# Patient Record
Sex: MALE | Race: WHITE | NOT HISPANIC OR LATINO | Employment: FULL TIME | URBAN - METROPOLITAN AREA
[De-identification: names, ages, dates, MRNs, and addresses within clinical notes are randomized per-mention and may not be internally consistent; named-entity substitution may affect disease eponyms.]

---

## 2024-07-15 ENCOUNTER — OFFICE VISIT (OUTPATIENT)
Dept: FAMILY MEDICINE | Facility: CLINIC | Age: 24
End: 2024-07-15
Payer: COMMERCIAL

## 2024-07-15 VITALS
BODY MASS INDEX: 27.23 KG/M2 | OXYGEN SATURATION: 96 % | HEART RATE: 95 BPM | DIASTOLIC BLOOD PRESSURE: 78 MMHG | WEIGHT: 194.5 LBS | SYSTOLIC BLOOD PRESSURE: 134 MMHG | RESPIRATION RATE: 16 BRPM | HEIGHT: 71 IN | TEMPERATURE: 98.6 F

## 2024-07-15 DIAGNOSIS — Z11.4 SCREENING FOR HIV (HUMAN IMMUNODEFICIENCY VIRUS): ICD-10-CM

## 2024-07-15 DIAGNOSIS — M65.4 DE QUERVAIN'S DISEASE (TENOSYNOVITIS): ICD-10-CM

## 2024-07-15 DIAGNOSIS — F43.22 ADJUSTMENT DISORDER WITH ANXIOUS MOOD: ICD-10-CM

## 2024-07-15 DIAGNOSIS — Z11.1 SCREENING FOR TUBERCULOSIS: ICD-10-CM

## 2024-07-15 DIAGNOSIS — Z13.1 SCREENING FOR DIABETES MELLITUS: ICD-10-CM

## 2024-07-15 DIAGNOSIS — Z11.59 NEED FOR HEPATITIS C SCREENING TEST: ICD-10-CM

## 2024-07-15 DIAGNOSIS — Z13.220 SCREENING CHOLESTEROL LEVEL: ICD-10-CM

## 2024-07-15 DIAGNOSIS — Z00.00 ENCOUNTER FOR WELLNESS EXAMINATION IN ADULT: Primary | ICD-10-CM

## 2024-07-15 DIAGNOSIS — E53.8 VITAMIN B12 DEFICIENCY (NON ANEMIC): ICD-10-CM

## 2024-07-15 DIAGNOSIS — E55.9 VITAMIN D DEFICIENCY: ICD-10-CM

## 2024-07-15 DIAGNOSIS — K50.819 CROHN'S DISEASE OF SMALL AND LARGE INTESTINES WITH COMPLICATION (H): ICD-10-CM

## 2024-07-15 LAB
ALBUMIN SERPL BCG-MCNC: 4.5 G/DL (ref 3.5–5.2)
ALP SERPL-CCNC: 75 U/L (ref 40–150)
ALT SERPL W P-5'-P-CCNC: 17 U/L (ref 0–70)
ANION GAP SERPL CALCULATED.3IONS-SCNC: 11 MMOL/L (ref 7–15)
AST SERPL W P-5'-P-CCNC: 22 U/L (ref 0–45)
BILIRUB SERPL-MCNC: 0.8 MG/DL
BUN SERPL-MCNC: 13 MG/DL (ref 6–20)
CALCIUM SERPL-MCNC: 9.1 MG/DL (ref 8.6–10)
CHLORIDE SERPL-SCNC: 106 MMOL/L (ref 98–107)
CHOLEST SERPL-MCNC: 168 MG/DL
CREAT SERPL-MCNC: 1.03 MG/DL (ref 0.67–1.17)
EGFRCR SERPLBLD CKD-EPI 2021: >90 ML/MIN/1.73M2
ERYTHROCYTE [DISTWIDTH] IN BLOOD BY AUTOMATED COUNT: 12.2 % (ref 10–15)
FASTING STATUS PATIENT QL REPORTED: YES
FASTING STATUS PATIENT QL REPORTED: YES
GLUCOSE SERPL-MCNC: 95 MG/DL (ref 70–99)
HCO3 SERPL-SCNC: 25 MMOL/L (ref 22–29)
HCT VFR BLD AUTO: 44.3 % (ref 40–53)
HCV AB SERPL QL IA: NONREACTIVE
HDLC SERPL-MCNC: 32 MG/DL
HGB BLD-MCNC: 14.8 G/DL (ref 13.3–17.7)
HIV 1+2 AB+HIV1 P24 AG SERPL QL IA: NONREACTIVE
LDLC SERPL CALC-MCNC: 126 MG/DL
MCH RBC QN AUTO: 29.1 PG (ref 26.5–33)
MCHC RBC AUTO-ENTMCNC: 33.4 G/DL (ref 31.5–36.5)
MCV RBC AUTO: 87 FL (ref 78–100)
NONHDLC SERPL-MCNC: 136 MG/DL
PLATELET # BLD AUTO: 271 10E3/UL (ref 150–450)
POTASSIUM SERPL-SCNC: 3.7 MMOL/L (ref 3.4–5.3)
PROT SERPL-MCNC: 7.5 G/DL (ref 6.4–8.3)
RBC # BLD AUTO: 5.09 10E6/UL (ref 4.4–5.9)
SODIUM SERPL-SCNC: 142 MMOL/L (ref 135–145)
TRIGL SERPL-MCNC: 49 MG/DL
VIT B12 SERPL-MCNC: 211 PG/ML (ref 232–1245)
VIT D+METAB SERPL-MCNC: 22 NG/ML (ref 20–50)
WBC # BLD AUTO: 5 10E3/UL (ref 4–11)

## 2024-07-15 PROCEDURE — 82306 VITAMIN D 25 HYDROXY: CPT | Performed by: FAMILY MEDICINE

## 2024-07-15 PROCEDURE — 80053 COMPREHEN METABOLIC PANEL: CPT | Performed by: FAMILY MEDICINE

## 2024-07-15 PROCEDURE — 90471 IMMUNIZATION ADMIN: CPT | Performed by: FAMILY MEDICINE

## 2024-07-15 PROCEDURE — 80061 LIPID PANEL: CPT | Performed by: FAMILY MEDICINE

## 2024-07-15 PROCEDURE — 90472 IMMUNIZATION ADMIN EACH ADD: CPT | Performed by: FAMILY MEDICINE

## 2024-07-15 PROCEDURE — 90636 HEP A/HEP B VACC ADULT IM: CPT | Performed by: FAMILY MEDICINE

## 2024-07-15 PROCEDURE — 87389 HIV-1 AG W/HIV-1&-2 AB AG IA: CPT | Performed by: FAMILY MEDICINE

## 2024-07-15 PROCEDURE — 85027 COMPLETE CBC AUTOMATED: CPT | Performed by: FAMILY MEDICINE

## 2024-07-15 PROCEDURE — 86803 HEPATITIS C AB TEST: CPT | Performed by: FAMILY MEDICINE

## 2024-07-15 PROCEDURE — 36415 COLL VENOUS BLD VENIPUNCTURE: CPT | Performed by: FAMILY MEDICINE

## 2024-07-15 PROCEDURE — 90651 9VHPV VACCINE 2/3 DOSE IM: CPT | Performed by: FAMILY MEDICINE

## 2024-07-15 PROCEDURE — 86481 TB AG RESPONSE T-CELL SUSP: CPT | Performed by: FAMILY MEDICINE

## 2024-07-15 PROCEDURE — 82607 VITAMIN B-12: CPT | Performed by: FAMILY MEDICINE

## 2024-07-15 PROCEDURE — 90715 TDAP VACCINE 7 YRS/> IM: CPT | Performed by: FAMILY MEDICINE

## 2024-07-15 PROCEDURE — 99213 OFFICE O/P EST LOW 20 MIN: CPT | Mod: 25 | Performed by: FAMILY MEDICINE

## 2024-07-15 PROCEDURE — 90480 ADMN SARSCOV2 VAC 1/ONLY CMP: CPT | Performed by: FAMILY MEDICINE

## 2024-07-15 PROCEDURE — 91320 SARSCV2 VAC 30MCG TRS-SUC IM: CPT | Performed by: FAMILY MEDICINE

## 2024-07-15 PROCEDURE — 99385 PREV VISIT NEW AGE 18-39: CPT | Mod: 25 | Performed by: FAMILY MEDICINE

## 2024-07-15 SDOH — HEALTH STABILITY: PHYSICAL HEALTH: ON AVERAGE, HOW MANY MINUTES DO YOU ENGAGE IN EXERCISE AT THIS LEVEL?: 10 MIN

## 2024-07-15 SDOH — HEALTH STABILITY: PHYSICAL HEALTH: ON AVERAGE, HOW MANY DAYS PER WEEK DO YOU ENGAGE IN MODERATE TO STRENUOUS EXERCISE (LIKE A BRISK WALK)?: 5 DAYS

## 2024-07-15 ASSESSMENT — PAIN SCALES - GENERAL: PAINLEVEL: NO PAIN (0)

## 2024-07-15 ASSESSMENT — SOCIAL DETERMINANTS OF HEALTH (SDOH): HOW OFTEN DO YOU GET TOGETHER WITH FRIENDS OR RELATIVES?: ONCE A WEEK

## 2024-07-15 NOTE — NURSING NOTE
Pt received COVID vaccine, TDAP vaccine, HPV9 vaccine, and Twinrix vaccine per provider, Dr. Heart's, orders.   Prior to immunization administration, verified patients identity using patient s name and date of birth. Pt given VIS forms prior to immunization administration.     Patient instructed to remain in clinic for 15 minutes afterwards, and to report any adverse reactions.     Performed by Mynor Mendenhall RN on 7/15/2024.

## 2024-07-15 NOTE — PROGRESS NOTES
{PROVIDER CHARTING PREFERENCE:382825}    Asya Moody is a 24 year old, presenting for the following health issues:  Physical      7/15/2024     9:36 AM   Additional Questions   Roomed by SUSAN Tyler   Accompanied by n/A     Healthy Habits:     Taking medications regularly:  0  History of Present Illness       Reason for visit:  New PCP check-in, Crohn s care, and immunization    He eats 2-3 servings of fruits and vegetables daily.He consumes 1 sweetened beverage(s) daily.He exercises with enough effort to increase his heart rate 9 or less minutes per day.  He exercises with enough effort to increase his heart rate 3 or less days per week.   He is taking medications regularly.       {MA/LPN/RN Pre-Provider Visit Orders- hCG/UA/Strep (Optional):501926}  {SUPERLIST (Optional):757556}  {additonal problems for provider to add (Optional):490871}    {ROS Picklists (Optional):845024}      Objective    There were no vitals taken for this visit.  There is no height or weight on file to calculate BMI.  Physical Exam   {Exam List (Optional):306043}    {Diagnostic Test Results (Optional):199497}        Signed Electronically by: Lauro Heck DO  {Email feedback regarding this note to [email or web address]   :811592}

## 2024-07-15 NOTE — PROGRESS NOTES
Preventive Care Visit  [facility]  Lauro Heck DO, Family Medicine  Jul 15, 2024  {Provider  Link to SmartSet :488801}    {PROVIDER CHARTING PREFERENCE:190137}    Asya Moody is a 24 year old, presenting for the following:  Physical        7/15/2024     9:36 AM   Additional Questions   Roomed by SUSAN Tylerrentice   Accompanied by n/A        Health Care Directive  Patient does not have a Health Care Directive or Living Will: {ADVANCE_DIRECTIVE_STATUS:734178}    HPI  ***  {MA/LPN/RN Pre-Provider Visit Orders- hCG/UA/Strep (Optional):691627}  {SUPERLIST (Optional):412442}  {additonal problems for provider to add (Optional):812072}       No data to display                   No data to display                   No data to display                  7/15/2024   Social Factors   Worry food won't last until get money to buy more No   Food not last or not have enough money for food? Yes   Do you have housing? (Housing is defined as stable permanent housing and does not include staying ouside in a car, in a tent, in an abandoned building, in an overnight shelter, or couch-surfing.) No   Are you worried about losing your housing? No   Lack of transportation? No   Unable to get utilities (heat,electricity)? No   Want help with housing or utility concern? No      (!) FOOD SECURITY CONCERN PRESENT(!) HOUSING CONCERN PRESENT       No data to display                     Today's PHQ-2 Score:       7/15/2024     8:07 AM   PHQ-2 ( 1999 Pfizer)   Q1: Little interest or pleasure in doing things 1   Q2: Feeling down, depressed or hopeless 1   PHQ-2 Score 2   Q1: Little interest or pleasure in doing things Several days   Q2: Feeling down, depressed or hopeless Several days   PHQ-2 Score 2            No data to display              Social History     Tobacco Use    Smoking status: Never    Smokeless tobacco: Never   Substance Use Topics    Alcohol use: Never    Drug use: Never     {Provider   If there are gaps in the social history shown above, please follow the link to update and then refresh the note Link to Social and Substance History :205694}         No data to display              {Provider  REQUIRED FOR AWV Use the storyboard to review patient history, after sections have been marked as reviewed, refresh note to capture documentation:080467}   Reviewed and updated as needed this visit by Provider                    {HISTORY OPTIONS (Optional):261544}    {ROS Picklists (Optional):286095}     Objective    Exam  There were no vitals taken for this visit.   There is no height or weight on file to calculate BMI.    Physical Exam  {Exam Choices (Optional):960115}        Signed Electronically by: Lauro Heck DO  {Email feedback regarding this note to [email or web address]   :721530}

## 2024-07-15 NOTE — PROGRESS NOTES
Preventive Care Visit  [facility]  Lauro Heck DO, Family Medicine  Jul 15, 2024      Assessment & Plan     Encounter for wellness examination in adult  I encouraged him to keep eating healthy foods and to work on getting regular exercise.  We are going to check fasting labs as listed below.    Crohn's disease of small and large intestines with complication (H)  He has been following closely with his GI specialist at [facility] in [place], [place].  I put in a referral so that he can establish care with a GI specialist here in the [facility] and I refilled the Humira to cover him until he has that appointment.  - adalimumab (HUMIRA) 40 MG/0.8ML prefilled syringe kit; Inject 0.8 mLs (40 mg) subcutaneously every 7 days  - Adult GI  Referral - Consult Only; Future    Adjustment disorder with anxious mood  He requested a referral to see a therapist to help with some anxiety symptoms.  - Adult Mental Health  Referral; Future    De Quervain's tenosynovitis, right   His symptoms, which have been present for the past couple of years, are consistent with de Quervain's tenosynovitis.  We discussed strategies that can help with this overuse problem including using a thumb spica splint.  He will let me know if symptoms do not improve.    Vitamin B12 deficiency (non anemic)  - CBC with platelets; Future  - Vitamin B12; Future  - CBC with platelets  - Vitamin B12    Vitamin D deficiency  - Vitamin D Deficiency; Future  - Vitamin D Deficiency    Screening for HIV (human immunodeficiency virus)  - HIV Antigen Antibody Combo; Future  - HIV Antigen Antibody Combo    Need for hepatitis C screening test  - Hepatitis C Screen Reflex to HCV RNA Quant and Genotype; Future  - Hepatitis C Screen Reflex to HCV RNA Quant and Genotype    Screening cholesterol level  - Lipid Profile (Chol, Trig, HDL, LDL calc); Future  - Lipid Profile (Chol, Trig, HDL, LDL calc)    Screening for diabetes  "mellitus  - Comprehensive metabolic panel (BMP + Alb, Alk Phos, ALT, AST, Total. Bili, TP); Future  - Comprehensive metabolic panel (BMP + Alb, Alk Phos, ALT, AST, Total. Bili, TP)    Screening for tuberculosis  - Quantiferon TB Gold Plus; Future  - Quantiferon TB Gold Plus    Patient has been advised of split billing requirements and indicates understanding: Yes        BMI  Estimated body mass index is 27.13 kg/m  as calculated from the following:    Height as of this encounter: 1.803 m (5' 11\").    Weight as of this encounter: 88.2 kg (194 lb 8 oz).   Weight management plan: Discussed healthy diet and exercise guidelines    Counseling  Appropriate preventive services were discussed with this patient, including applicable screening as appropriate for fall prevention, nutrition, physical activity, Tobacco-use cessation, weight loss and cognition.  Checklist reviewing preventive services available has been given to the patient.  Reviewed patient's diet, addressing concerns and/or questions.           Asya Moody is a 24 year old, presenting for the following:  Physical (Pt is fasting)        7/15/2024     9:36 AM   Additional Questions   Roomed by Ruby WAKEFIELD MA apprentice   Accompanied by n/A          History of Present Illness       Reason for visit:  New PCP check-in, Crohn s care, and immunization    He eats 2-3 servings of fruits and vegetables daily.He consumes 1 sweetened beverage(s) daily.He exercises with enough effort to increase his heart rate 9 or less minutes per day.  He exercises with enough effort to increase his heart rate 3 or less days per week.   He is taking medications regularly.    He has a medical history significant for Crohn's disease, B12 deficiency and vitamin D deficiency.  He recently moved to [facility] from [place], [place].  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  He has been on Humira 40 mg weekly to help control the " Crohn's.  He needs to establish care with a new gastroenterology specialist.    Previous endoscopy and imagin - erosion at pylorus, rectal ulcers   - (Pre TNF) - duodenal erosions with surrounding erythema/ inflammation in descending colon, sigmoid, rectum with erythema/edema/exudate but no deep ulcers, ileocecal valve inflammation , TI w/ erythema/petichiae/edema, frability  - normal EGD/colon on infliximab  3/2023 - colonoscopy - Total SES-CD aggregate score: 12. Appearance is similar to a Bucio 2   panUC with more superifical erosions. Biopsies were taken with a cold   forceps for histology. Right colon, jar A, transverse, jar C, left   colon, jar D, rectum, jar E. Left-sided inflammation as somewhat worse   than right-sided with slightly large aphthae, still 2mm in size/small,   with mucus.similar to bucio 1-2 uc, no aphthae in ileum    Pathologic Diagnosis   A. Colon, right, biopsy:  Colonic mucosa with mild to moderate active inflammation.     B. Terminal ileum, biopsy:  Ileal mucosa with focal minimal to mild active inflammation.     C. Colon, transverse, biopsy:  Colonic mucosa with mild active inflammation.     D. Colon, left, biopsy:  Mild active colitis with increased lamina propria lymphoplasmacytic inflammation and focal mucosal granuloma.     E. Rectum, biopsy:  Mild active proctitis with increased lamina propria lymphoplasmacytic inflammation and focal mucosal granuloma.     See comment.   Electronically signed by Gurmeet Hi MD on 4/3/2023 at 1259   Comment   As per EMR, clinical history of Crohn's disease/IBD is noted. No dysplasia is identified.     MRI  - distal ileal mild thickening, left colon collapse, renal cysts  Renal US  cystic change in upper pole of L kidney  Dexa  - normal                7/15/2024   General Health   How would you rate your overall physical health? Good   Feel stress (tense, anxious, or unable to sleep) Rather much      (!) STRESS CONCERN       7/15/2024   Nutrition   Three or more servings of calcium each day? (!) I DON'T KNOW   Diet: Regular (no restrictions)   How many servings of fruit and vegetables per day? (!) 2-3   How many sweetened beverages each day? 0-1            7/15/2024   Exercise   Days per week of moderate/strenous exercise 5 days   Average minutes spent exercising at this level 10 min            7/15/2024   Social Factors   Frequency of gathering with friends or relatives Once a week   Worry food won't last until get money to buy more No    No   Food not last or not have enough money for food? Yes    Yes   Do you have housing? (Housing is defined as stable permanent housing and does not include staying ouside in a car, in a tent, in an abandoned building, in an overnight shelter, or couch-surfing.) Yes    No   Are you worried about losing your housing? No    No   Lack of transportation? No    No   Unable to get utilities (heat,electricity)? No    No   Want help with housing or utility concern? No       Multiple values from one day are sorted in reverse-chronological order   (!) FOOD SECURITY CONCERN PRESENT      7/15/2024   Dental   Dentist two times every year? Yes            7/15/2024   TB Screening   Were you born outside of the [place]? No            Today's PHQ-2 Score:       7/15/2024     8:07 AM   PHQ-2 ( 1999 Pfizer)   Q1: Little interest or pleasure in doing things 1   Q2: Feeling down, depressed or hopeless 1   PHQ-2 Score 2   Q1: Little interest or pleasure in doing things Several days   Q2: Feeling down, depressed or hopeless Several days   PHQ-2 Score 2           7/15/2024   Substance Use   Alcohol more than 3/day or more than 7/wk No   Do you use any other substances recreationally? No        Social History     Tobacco Use    Smoking status: Never    Smokeless tobacco: Never   Vaping Use    Vaping status: Never Used   Substance Use Topics    Alcohol use: Never    Drug use: Never             7/15/2024   One time HIV Screening  "  Previous HIV test? No          7/15/2024   STI Screening   New sexual partner(s) since last STI/HIV test? No            7/15/2024   Contraception/Family Planning   Questions about contraception or family planning No           Reviewed and updated as needed this visit by Provider     Meds                      Review of Systems  Constitutional, HEENT, cardiovascular, pulmonary, GI, , musculoskeletal, neuro, skin, endocrine and psych systems are negative, except as otherwise noted.     Objective    Exam  /78   Pulse 95   Temp 98.6  F (37  C) (Oral)   Resp 16   Ht 1.803 m (5' 11\")   Wt 88.2 kg (194 lb 8 oz)   SpO2 96%   BMI 27.13 kg/m     Estimated body mass index is 27.13 kg/m  as calculated from the following:    Height as of this encounter: 1.803 m (5' 11\").    Weight as of this encounter: 88.2 kg (194 lb 8 oz).    Physical Exam  GENERAL: alert and no distress  EYES: Eyes grossly normal to inspection, PERRL and conjunctivae and sclerae normal  HENT: ear canals and TM's normal, nose and mouth without ulcers or lesions  NECK: no adenopathy, no asymmetry, masses, or scars  RESP: lungs clear to auscultation - no rales, rhonchi or wheezes  CV: regular rate and rhythm, normal S1 S2, no S3 or S4, no murmur, click or rub, no peripheral edema  ABDOMEN: soft, nontender, no hepatosplenomegaly, no masses and bowel sounds normal  MS: no gross musculoskeletal defects noted, no edema.  Finkelstein's test positive on the right.  SKIN: no suspicious lesions or rashes  NEURO: Normal strength and tone, mentation intact and speech normal  PSYCH: mentation appears normal, affect normal/bright        Signed Electronically by: Lauro Heck, DO    "

## 2024-07-16 LAB
GAMMA INTERFERON BACKGROUND BLD IA-ACNC: 0.07 IU/ML
M TB IFN-G BLD-IMP: NEGATIVE
M TB IFN-G CD4+ BCKGRND COR BLD-ACNC: 9.93 IU/ML
MITOGEN IGNF BCKGRD COR BLD-ACNC: -0.01 IU/ML
MITOGEN IGNF BCKGRD COR BLD-ACNC: 0.05 IU/ML
QUANTIFERON MITOGEN: 10 IU/ML
QUANTIFERON NIL TUBE: 0.07 IU/ML
QUANTIFERON TB1 TUBE: 0.06 IU/ML
QUANTIFERON TB2 TUBE: 0.12

## 2024-07-16 RX ORDER — LANOLIN ALCOHOL/MO/W.PET/CERES
1000 CREAM (GRAM) TOPICAL DAILY
Qty: 90 TABLET | Refills: 3 | Status: SHIPPED | OUTPATIENT
Start: 2024-07-16

## 2024-07-31 ENCOUNTER — MYC MEDICAL ADVICE (OUTPATIENT)
Dept: FAMILY MEDICINE | Facility: CLINIC | Age: 24
End: 2024-07-31
Payer: COMMERCIAL

## 2024-07-31 NOTE — TELEPHONE ENCOUNTER
REFERRAL INFORMATION:  Referring Provider:  Lauro Carreon DO   Referring Clinic:  [facility]   Reason for Visit/Diagnosis: Crohn's disease of small and large intestines with complication (H)      FUTURE VISIT INFORMATION:  Appointment Date: 9/24/2024  Appointment Time:      NOTES STATUS DETAILS   OFFICE NOTE from Referring Provider Internal 7/15/2024 OV with KODAK Bennett   OFFICE NOTE from Other Specialist Care Everywhere [facility]:   11/1/2023 OV with BATOOL Reeder   5/10/2023 OV with JUDIE Pride   HOSPITAL DISCHARGE SUMMARY/  ED VISITS N/A    OPERATIVE REPORT N/A    MEDICATION LIST Internal         ENDOSCOPY  Care Everywhere 3/24/2023 [facility]   COLONOSCOPY N/A    IMAGING (CT, MRI, EGD, MRCP, Small Bowel Follow Through/SBT, MR/CT Enterography) N/A

## 2024-08-07 ENCOUNTER — MYC MEDICAL ADVICE (OUTPATIENT)
Dept: FAMILY MEDICINE | Facility: CLINIC | Age: 24
End: 2024-08-07
Payer: COMMERCIAL

## 2024-08-07 DIAGNOSIS — K50.819 CROHN'S DISEASE OF SMALL AND LARGE INTESTINES WITH COMPLICATION (H): Primary | ICD-10-CM

## 2024-08-07 RX ORDER — ADALIMUMAB-ADAZ 40 MG/.4ML
40 INJECTION, SOLUTION SUBCUTANEOUS
Qty: 1.6 ML | Refills: 2 | Status: SHIPPED | OUTPATIENT
Start: 2024-08-07 | End: 2024-09-24 | Stop reason: ALTCHOICE

## 2024-08-07 NOTE — TELEPHONE ENCOUNTER
DE,  Please see below MyChart message and advise.  Pended new order if approved  Thanks,  Mabel LEWIS RN

## 2024-08-08 ENCOUNTER — TELEPHONE (OUTPATIENT)
Dept: FAMILY MEDICINE | Facility: CLINIC | Age: 24
End: 2024-08-08
Payer: COMMERCIAL

## 2024-08-08 NOTE — TELEPHONE ENCOUNTER
PA Needed    Medication: hyrimoz  QTY/DS: 1.6 per   NEW INS: no  Insurance Company:  [employer]  Pharmacy Filling the Rx:  [facility] :  new med  Date of last fill:

## 2024-08-08 NOTE — TELEPHONE ENCOUNTER
PA Initiation    Medication: HYRIMOZ 40 MG/0.4ML SC SOAJ  Insurance Company: [employer] - Phone [phone] Fax [phone]  Pharmacy Filling the Rx: [facility] - [place], [place] - [address]  Filling Pharmacy Phone:    Filling Pharmacy Fax:    Start Date: 8/8/2024  [id number]

## 2024-08-09 NOTE — TELEPHONE ENCOUNTER
Prior Authorization Approval    Medication: HYRIMOZ 40 MG/0.4ML SC SOAJ  Authorization Effective Date: 7/10/2024  Authorization Expiration Date: 8/9/2025  Approved Dose/Quantity: 4/28  Reference #: [id number]   Insurance Company: [employer] Phone [phone] Fax [phone]  Expected CoPay: $    CoPay Card Available:      Financial Assistance Needed:    Which Pharmacy is filling the prescription: [facility] - [place], [place] - [address]  Pharmacy Notified:    Patient Notified:

## 2024-08-12 ENCOUNTER — ALLIED HEALTH/NURSE VISIT (OUTPATIENT)
Dept: FAMILY MEDICINE | Facility: CLINIC | Age: 24
End: 2024-08-12
Payer: COMMERCIAL

## 2024-08-12 DIAGNOSIS — Z23 ENCOUNTER FOR IMMUNIZATION: Primary | ICD-10-CM

## 2024-08-12 PROCEDURE — 90636 HEP A/HEP B VACC ADULT IM: CPT

## 2024-08-12 PROCEDURE — 90471 IMMUNIZATION ADMIN: CPT

## 2024-08-12 PROCEDURE — 90472 IMMUNIZATION ADMIN EACH ADD: CPT

## 2024-08-12 PROCEDURE — 99207 PR NO CHARGE NURSE ONLY: CPT

## 2024-08-12 PROCEDURE — 90651 9VHPV VACCINE 2/3 DOSE IM: CPT

## 2024-08-12 NOTE — PROGRESS NOTES
Prior to immunization administration, verified patients identity using patient s name and date of birth. Please see Immunization Activity for additional information.     Screening Questionnaire for Adult Immunization    Are you sick today?   No   Do you have allergies to medications, food, a vaccine component or latex?   Yes   Have you ever had a serious reaction after receiving a vaccination?   No   Do you have a long-term health problem with heart, lung, kidney, or metabolic disease (e.g., diabetes), asthma, a blood disorder, no spleen, complement component deficiency, a cochlear implant, or a spinal fluid leak?  Are you on long-term aspirin therapy?   Yes   Do you have cancer, leukemia, HIV/AIDS, or any other immune system problem?   No   Do you have a parent, brother, or sister with an immune system problem?   No   In the past 3 months, have you taken medications that affect  your immune system, such as prednisone, other steroids, or anticancer drugs; drugs for the treatment of rheumatoid arthritis, Crohn s disease, or psoriasis; or have you had radiation treatments?   No   Have you had a seizure, or a brain or other nervous system problem?   No   During the past year, have you received a transfusion of blood or blood    products, or been given immune (gamma) globulin or antiviral drug?   No   For women: Are you pregnant or is there a chance you could become       pregnant during the next month?   No   Have you received any vaccinations in the past 4 weeks?   Yes     Immunization questionnaire was positive for at least one answer.  Notified Jacek.    I have reviewed the following standing orders:     This patient is due and qualifies for the Hepatitis A & B vaccine.    Click here for HEP A & B STANDING ORDER    I have reviewed the vaccines inclusion and exclusion criteria; No concerns regarding eligibility.         This patient is due and qualifies for the HPV vaccine.    Click here for HPV (Adult 15-45Y)  Standing Order     I have reviewed the vaccines inclusion and exclusion criteria;No concerns regarding eligibility.       Patient instructed to remain in clinic for 15 minutes afterwards, and to report any adverse reactions.     Screening performed by Layla Roy MA on 8/12/2024 at 3:03 PM.

## 2024-08-14 ENCOUNTER — TELEPHONE (OUTPATIENT)
Dept: FAMILY MEDICINE | Facility: CLINIC | Age: 24
End: 2024-08-14
Payer: COMMERCIAL

## 2024-08-14 DIAGNOSIS — K50.819 CROHN'S DISEASE OF SMALL AND LARGE INTESTINES WITH COMPLICATION (H): Primary | ICD-10-CM

## 2024-08-14 NOTE — TELEPHONE ENCOUNTER
Patient called back,    Patient verbalized understanding of below message.  Patient will continue with Humira until GI appointment    Adriel GUPTA RN

## 2024-08-14 NOTE — TELEPHONE ENCOUNTER
Please contact this patient and let him know that the Hyrimoz appears to be on backorder and not available.  It looks like he should still have Humira available.  I recommend that he continue with this (Humira) to see if he tolerates it well.  I know he was having some discomfort with injections early on.  Hopefully this has improved.  It would be helpful to get input from GI about alternatives once he is able to get in for an appointment with them.  Thank you, DE

## 2024-08-14 NOTE — TELEPHONE ENCOUNTER
Left message for patient to call [facility] back  When patient calls back please transfer to an RN  Adriel GUPTA RN

## 2024-08-14 NOTE — TELEPHONE ENCOUNTER
DE,    Pharmacy calling,    Recent order of Hyrimoz is listed as long term back order with no estimated timeframe.  Would like to know if there are any other alternatives.    Thanks,  Adriel GUPTA RN

## 2024-08-16 NOTE — TELEPHONE ENCOUNTER
DE/DOD,  Pharmacy calling to ask about Humira prescription.  Patient has had tolerance issues with regular humira in the past- newer citrate free version is often better tolerated.  Do you want to order this instead?  Meredith GUPTA RN

## 2024-08-17 NOTE — TELEPHONE ENCOUNTER
Please let pt know that I sent in the citrate free Humira Rx to his pharmacy. Hopefully this will be better tolerated. -DE

## 2024-08-19 NOTE — TELEPHONE ENCOUNTER
Left message for patient to call [facility] back  When patient calls back please transfer to an RN  Adriel GUPTA RN

## 2024-08-26 ENCOUNTER — MYC MEDICAL ADVICE (OUTPATIENT)
Dept: FAMILY MEDICINE | Facility: CLINIC | Age: 24
End: 2024-08-26
Payer: COMMERCIAL

## 2024-08-28 ENCOUNTER — E-VISIT (OUTPATIENT)
Dept: FAMILY MEDICINE | Facility: CLINIC | Age: 24
End: 2024-08-28
Payer: COMMERCIAL

## 2024-08-28 ENCOUNTER — MYC MEDICAL ADVICE (OUTPATIENT)
Dept: FAMILY MEDICINE | Facility: CLINIC | Age: 24
End: 2024-08-28

## 2024-08-28 DIAGNOSIS — R21 RASH AND NONSPECIFIC SKIN ERUPTION: Primary | ICD-10-CM

## 2024-08-28 DIAGNOSIS — M79.675 PAIN OF LEFT GREAT TOE: ICD-10-CM

## 2024-08-28 PROCEDURE — 99421 OL DIG E/M SVC 5-10 MIN: CPT | Performed by: FAMILY MEDICINE

## 2024-08-28 NOTE — TELEPHONE ENCOUNTER
Provider E-Visit time total (minutes): 6    1. Rash and nonspecific skin eruption    2. Pain of left great toe    The underlying cause of these symptoms is not entirely clear.  The photograph makes me wonder if there is a mild ingrown toenail causing the inflammation and some skin breakdown.  Another concern is that symptoms could be related to tinea pedis.  I recommend soaking the toe in warm soapy water, using shoes with a wide toebox and trying a topical antifungal to see if this helps.  If symptoms do not improve I would suggest scheduling a clinic appointment.

## 2024-09-24 ENCOUNTER — OFFICE VISIT (OUTPATIENT)
Dept: GASTROENTEROLOGY | Facility: CLINIC | Age: 24
End: 2024-09-24
Payer: COMMERCIAL

## 2024-09-24 ENCOUNTER — PRE VISIT (OUTPATIENT)
Dept: GASTROENTEROLOGY | Facility: CLINIC | Age: 24
End: 2024-09-24

## 2024-09-24 VITALS
WEIGHT: 181 LBS | SYSTOLIC BLOOD PRESSURE: 136 MMHG | HEART RATE: 97 BPM | BODY MASS INDEX: 25.34 KG/M2 | HEIGHT: 71 IN | DIASTOLIC BLOOD PRESSURE: 95 MMHG | OXYGEN SATURATION: 95 %

## 2024-09-24 DIAGNOSIS — K50.819 CROHN'S DISEASE OF SMALL AND LARGE INTESTINES WITH COMPLICATION (H): Primary | ICD-10-CM

## 2024-09-24 PROCEDURE — 90656 IIV3 VACC NO PRSV 0.5 ML IM: CPT | Performed by: INTERNAL MEDICINE

## 2024-09-24 PROCEDURE — 90471 IMMUNIZATION ADMIN: CPT | Performed by: INTERNAL MEDICINE

## 2024-09-24 PROCEDURE — 99205 OFFICE O/P NEW HI 60 MIN: CPT | Mod: 25 | Performed by: INTERNAL MEDICINE

## 2024-09-24 NOTE — PROGRESS NOTES
GASTROENTEROLOGY NEW PATIENT CLINIC VISIT    CC/REFERRING MD:    Una Heck, Lauro Heck    REASON FOR CONSULTATION:   Lauro Heck for   Chief Complaint   Patient presents with    New Patient     Follow-up Crohn's disease of large intestine       HISTORY OF PRESENT ILLNESS:    Fransisco Mosqueda is 24 year old male who presents for evaluation of Crohn's disease. He was diagnosed in 2013 when he was losing weight and experiencing significant diarrhea and fatigue in middle school. Records indicate ileocolonic Crohn's with possible gastroduodenal involvement as well (see below). Initially treated with enteral nutrition/liquid diet in 9th grade. Then started on Humira + methotrexate but did not do well with self-injections. Switched to Remicade, then later Inflectra due to injection pain. He was switched back to Humira due to insurance/cost in the Fall of 2022 and had been on this 40 mg q2 weeks. He experienced a symptom flare in February 2023. Infectious workup was negative. CRP on 2/10/23 was 2.2. Fecal calprotectin on 2/16/23 was 1,130. Adalimumab level on 3/10/2023 was 4.7 (no antibodies) so his dose was increased to 40 mg weekly. Colonoscopy on 3/24/23 showed moderate inflammation from the rectum to the cecum; the examined portion of the ileum appeared normal. Biopsies showed  mild to moderately active pancolitis. Repeat ADA level on 5/26/2023 was 17.8 (no antibodies) - he believes this was a true trough level. Repeat CRP was 1.0. Last outpatient GI visit at [facility] was on 11/1/2023 at which time he was doing well on weekly dosing. He was recommended to continue the same regimen and follow up for colonoscopy in the Spring of 2024 to re-evaluate disease status.     His follow up was not completed since he ended up moving to [place] in June 2024. He has continued on his weekly Humira and currently feels well. His bowel movements are regular. Typically has 2-3 formed stools per  "day. He denies abdominal pain, diarrhea, or blood in the stool. Weight has been stable to slight increase due to working sedentary job. He denies any joint pain, skin issues, or eye/vision concerns. He tries to avoid NSAIDs, but ends up taking ibuprofen a few times per month for headaches. Tries to use Tylenol when possible. He denies any reflux, heartburn, or other upper GI symptoms. Had GERD symptoms in the past for which he took Pepcid, but symptoms have improved since he changed his diet. 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       Disease/Date of Dx:   2013  Extent/Location:   ileocolonic / ? UGI CD (mild)   From documentation in Care Everywhere dated 8/22/2022:   \"2013 - erosion at pylorus, rectal ulcers  2015 - (Pre TNF) - duodenal erosions with surrounding erythema/ inflammation in descending colon, sigmoid, rectum with erythema/edema/exudate but no deep ulcers, ileocecal valve inflammation , TI w/ erythema/petichiae/edema, frability  2021- normal EGD/colon on infliximab\"    Last Colonoscopy: 3/24/2023 at [facility] in [place], [place]  Findings:       The perianal and digital rectal examinations were normal.        The terminal ileum appeared normal. Biopsies were taken with a cold        forceps for histology. The pathology specimen was placed into Bottle B.        Inflammation characterized by congestion (edema), erosions, granularity,        loss of vascularity, aphthous ulcerations and shallow ulcerations was        found in a continuous and circumferential pattern from the rectum to the        cecum. The inflammation was moderate in severity, and when compared to        previous examinations, the findings are worsened.        The Simple Endoscopic Score for Crohn's Disease was determined based on        the endoscopic appearance of the mucosa in the following segments:        - Ileum: " Findings include no ulcers present, no ulcerated surfaces, no        affected surfaces and no narrowings. Segment score: 0.        - Right Colon: Findings include aphthous ulcers less than 0.5 cm in        size, less than 10% ulcerated surfaces, less than 50% of surfaces        affected and no narrowings. Segment score: 3.        - Transverse Colon: Findings include aphthous ulcers less than 0.5 cm in        size, less than 10% ulcerated surfaces, less than 50% of surfaces        affected and no narrowings. Segment score: 3.        - Left Colon: Findings include aphthous ulcers less than 0.5 cm in size,        less than 10% ulcerated surfaces, less than 50% of surfaces affected and        no narrowings. Segment score: 3.        - Rectum: Findings include aphthous ulcers less than 0.5 cm in size,        less than 10% ulcerated surfaces, less than 50% of surfaces affected and        no narrowings. Segment score: 3.        - Total SES-CD aggregate score: 12. Appearance is similar to a Bucio 2        panUC with more superifical erosions. Biopsies were taken with a cold        forceps for histology. Right colon, jar A, transverse, jar C, left        colon, jar D, rectum, jar E. Left-sided inflammation as somewhat worse        than right-sided with slightly large aphthae, still 2mm in size/small,        with mucus.        Retroflexion not performed due to rectal inflammation                                                                                    Impression:            - Preparation of the colon was fair.                          - The examined portion of the ileum was normal.                          Biopsied.                          - Crohn's disease. Inflammation was found from the                          rectum to the cecum. This was moderate in severity,                          worsened compared to previous examinations.                          - Simple Endoscopic Score for Crohn's Disease: 12,                           mucosal inflammatory changes. Biopsied.   Recommendation:        - Await pathology results.                          - increase humira to weekly due to low levels                          - trial uceris as a bridge as we increse humira to                          avoid pred     Component 1 yr ago   Case Report Surgical Pathology                                Case: [id number]                                  Authorizing Provider:  Simin Vargas MD     Collected:           03/24/2023 1216              Ordering Location:     [facility]   Received:            03/27/2023 0812                                     [facility] - [place]                                                            Pathologist:           Gurmeet Hi MD                                                                Specimens:   A) - Colon, biopsy, Right Colon Bx (SF)                                                             B) - Colon, biopsy, Terminal Ileum Bx (SF)                                                         C) - Colon, biopsy, Transverse Colon Bx (SF)                                                       D) - Colon, biopsy, Left Colon Bx (SF)                                                             E) - Colon, biopsy, Rectal Bx (SF)                                                     Pathologic Diagnosis    A. Colon, right, biopsy:  Colonic mucosa with mild to moderate active inflammation.      B. Terminal ileum, biopsy:  Ileal mucosa with focal minimal to mild active inflammation.      C. Colon, transverse, biopsy:  Colonic mucosa with mild active inflammation.      D. Colon, left, biopsy:  Mild active colitis with increased lamina propria lymphoplasmacytic inflammation and focal mucosal granuloma.      E. Rectum, biopsy:  Mild active proctitis with increased lamina propria lymphoplasmacytic inflammation and focal mucosal granuloma.      See comment.    Electronically signed by Gurmeet Hi MD on  4/3/2023 at 12:59 PM   Comment    As per EMR, clinical history of Crohn's disease/IBD is noted. No dysplasia is identified.        Last MRE/CTE: MRI 2013 - distal ileal mild thickening, left colon collapse, renal cysts  CRP level: 2.0 on 11/1/2023  Calprotectin level: 1,130 on 2/16/2023  Extraintestinal Manifestations:  none currently; prior notes from OSH indicate h/o fatigue, knee pains, and small mouth aphthous ulcers   Past Surgery:   none    Past Medications:  humira from 2016-17 - no antibodies to humira, stopped due to injection site pain (significant fear of taking injection due to pain, feels he could do citrate free) , no methotrexate since 2019 (has been on both po/sc) stopped after no antibodies. Inflectra 800mg q6 wqks (switched back to Humira due to convenience/cost), Enteral nutrition   Current Medications:  adalimumab (Humira *CF*) 40 mg once weekly  Drug Levels: ADA level 17.8 micrograms/mL   5/26/2023 (on weekly dosing), ADA level 4.7 micrograms/mL   3/10/2023 (on q2 week dosing)  Biologics: adalimumab currently, infliximab previously (no antibiotics, switched due to cost)  Prior TPMPT (N/A), prior thiopurine level (N/A)  History of tobacco:  no  History of c-diff:  no    Cancer Screening:  Next dysplasia screening is recommended:  [id number], but plan to update colonoscopy in the next 3-6 months to re-evaluate disease activity    Recommend all IBD patients on biologic therapy meet with our medical therapy management team to review health maintenance needs at least yearly.      ASSESSMENT/PLAN:    Fransisco Mosqueda is a 24 year old male who presents for evaluation of ileocolonic inflammatory Crohn's disease with possible UGI CD from review of prior outside documentation. Currently doing well clinically on Humira 40 mg weekly. Last colonoscopy in 3/2023 at [facility] showed mild to moderately active pancolitis with a normal examined TI in the setting of being off Humira and he had a low drug level at that  time.  Repeat ADA level on the weekly dosing interval was improved to 17.8 with no antibodies. He was supposed to follow up for repeat colonoscopy in the Spring of 2024 to re-evaluate disease activity, but was lost to follow up due to moving to [place] in June 2024. Will plan to have him continue Humira 40 mg weekly and will update colonoscopy (in approximately 6 months as he will need to arrange a ride, possibly from someone out of town).  We will then be able to ensure endoscopic and histologic remission. Will also update standard labs, fecal calprotectin next available. Of note, Quant-TB Gold on 7/15/2024 was negative so this is up to date. Given the question of UGI CD, will obtain MR enterography next available. He is not experiencing any upper GI symptoms at this time so will defer EGD. However, should symptoms arise, can plan for EGD at the time of colonoscopy. Lastly, will refer patient to [facility] pharmacy to ensure health maintenance is up to date.     RTC 6-12 months depending on Cscope findings.     Dysplasia surveillance: every 2-3 years, starting after next colonoscopy    Scribe Disclosure:   ICamille PA-C, am serving as a scribe; to document services personally performed by Kelvin De La Paz MD -based on data collection and the provider's statements to me.     Provider Disclosure:  I agree with above History, Review of Systems, Physical exam and Plan.  I have reviewed the content of the documentation and have edited it as needed. I have personally performed the services documented here and the documentation accurately represents those services and the decisions I have made.      Electronically signed by:  Kelvin De La Paz MD      Thank you for this consultation.  It was a pleasure to participate in the care of this patient; please contact us with any further questions.  A total of 60 minutes was spent with reviewing the chart, discussing with the patient, documentation and  coordination of care on the day of this encounter.    This note was created with voice recognition software, and while reviewed for accuracy, typos may remain.     Kelvin De La Paz MD  Adjunct  of Medicine  Division of Gastroenterology, Hepatology and Nutrition  [facility]  [phone]

## 2024-09-24 NOTE — NURSING NOTE
"Fransisco Mosqueda's goals for this visit include:   Chief Complaint   Patient presents with    New Patient     Follow-up Crohn's disease of large intestine       PCP: Lauro Carreon    Referring Provider:  Lauro Heck DO  [address] [facility] [place] [address]      Initial BP (!) 136/95 (BP Location: Left arm, Patient Position: Sitting, Cuff Size: Adult Regular)   Pulse 97   Ht 1.791 m (5' 10.5\")   Wt 82.1 kg (181 lb)   SpO2 95%   BMI 25.60 kg/m   Estimated body mass index is 25.6 kg/m  as calculated from the following:    Height as of this encounter: 1.791 m (5' 10.5\").    Weight as of this encounter: 82.1 kg (181 lb).    Medication Reconciliation: complete    Do you need any medication refills at today's visit? none    REX Snow  Rheumatology/Infectious disease  [facility]   [phone]      "

## 2024-09-24 NOTE — LETTER
9/24/2024      Fransisco Mosqueda  [address]  [place] [address]      Dear Colleague,    Thank you for referring your patient, Fransisco Mosqueda, to the [facility]. Please see a copy of my visit note below.          GASTROENTEROLOGY NEW PATIENT CLINIC VISIT    CC/REFERRING MD:    Una Heck, Lauro Heck    REASON FOR CONSULTATION:   Lauro Heck for   Chief Complaint   Patient presents with     New Patient     Follow-up Crohn's disease of large intestine       HISTORY OF PRESENT ILLNESS:    Fransisco Mosqueda is 24 year old male who presents for evaluation of Crohn's disease. He was diagnosed in 2013 when he was losing weight and experiencing significant diarrhea and fatigue in middle school. Records indicate ileocolonic Crohn's with possible gastroduodenal involvement as well (see below). Initially treated with enteral nutrition/liquid diet in 9th grade. Then started on Humira + methotrexate but did not do well with self-injections. Switched to Remicade, then later Inflectra due to injection pain. He was switched back to Humira due to insurance/cost in the Fall of 2022 and had been on this 40 mg q2 weeks. He experienced a symptom flare in February 2023. Infectious workup was negative. CRP on 2/10/23 was 2.2. Fecal calprotectin on 2/16/23 was 1,130. Adalimumab level on 3/10/2023 was 4.7 (no antibodies) so his dose was increased to 40 mg weekly. Colonoscopy on 3/24/23 showed moderate inflammation from the rectum to the cecum; the examined portion of the ileum appeared normal. Biopsies showed  mild to moderately active pancolitis. Repeat ADA level on 5/26/2023 was 17.8 (no antibodies) - he believes this was a true trough level. Repeat CRP was 1.0. Last outpatient GI visit at [facility] was on 11/1/2023 at which time he was doing well on weekly dosing. He was recommended to continue the same regimen and follow up for colonoscopy in the Spring of  "2024 to re-evaluate disease status.     His follow up was not completed since he ended up moving to [place] in June 2024. He has continued on his weekly Humira and currently feels well. His bowel movements are regular. Typically has 2-3 formed stools per day. He denies abdominal pain, diarrhea, or blood in the stool. Weight has been stable to slight increase due to working sedentary job. He denies any joint pain, skin issues, or eye/vision concerns. He tries to avoid NSAIDs, but ends up taking ibuprofen a few times per month for headaches. Tries to use Tylenol when possible. He denies any reflux, heartburn, or other upper GI symptoms. Had GERD symptoms in the past for which he took Pepcid, but symptoms have improved since he changed his diet. 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       Disease/Date of Dx:   2013  Extent/Location:   ileocolonic / ? UGI CD (mild)   From documentation in Care Everywhere dated 8/22/2022:   \"2013 - erosion at pylorus, rectal ulcers  2015 - (Pre TNF) - duodenal erosions with surrounding erythema/ inflammation in descending colon, sigmoid, rectum with erythema/edema/exudate but no deep ulcers, ileocecal valve inflammation , TI w/ erythema/petichiae/edema, frability  2021- normal EGD/colon on infliximab\"    Last Colonoscopy: 3/24/2023 at [facility] in [place], [place]  Findings:       The perianal and digital rectal examinations were normal.        The terminal ileum appeared normal. Biopsies were taken with a cold        forceps for histology. The pathology specimen was placed into Bottle B.        Inflammation characterized by congestion (edema), erosions, granularity,        loss of vascularity, aphthous ulcerations and shallow ulcerations was        found in a continuous and circumferential pattern from the rectum to the        cecum. The inflammation was moderate in " severity, and when compared to        previous examinations, the findings are worsened.        The Simple Endoscopic Score for Crohn's Disease was determined based on        the endoscopic appearance of the mucosa in the following segments:        - Ileum: Findings include no ulcers present, no ulcerated surfaces, no        affected surfaces and no narrowings. Segment score: 0.        - Right Colon: Findings include aphthous ulcers less than 0.5 cm in        size, less than 10% ulcerated surfaces, less than 50% of surfaces        affected and no narrowings. Segment score: 3.        - Transverse Colon: Findings include aphthous ulcers less than 0.5 cm in        size, less than 10% ulcerated surfaces, less than 50% of surfaces        affected and no narrowings. Segment score: 3.        - Left Colon: Findings include aphthous ulcers less than 0.5 cm in size,        less than 10% ulcerated surfaces, less than 50% of surfaces affected and        no narrowings. Segment score: 3.        - Rectum: Findings include aphthous ulcers less than 0.5 cm in size,        less than 10% ulcerated surfaces, less than 50% of surfaces affected and        no narrowings. Segment score: 3.        - Total SES-CD aggregate score: 12. Appearance is similar to a Bucio 2        panUC with more superifical erosions. Biopsies were taken with a cold        forceps for histology. Right colon, jar A, transverse, jar C, left        colon, jar D, rectum, jar E. Left-sided inflammation as somewhat worse        than right-sided with slightly large aphthae, still 2mm in size/small,        with mucus.        Retroflexion not performed due to rectal inflammation                                                                                    Impression:            - Preparation of the colon was fair.                          - The examined portion of the ileum was normal.                          Biopsied.                          - Crohn's disease.  Inflammation was found from the                          rectum to the cecum. This was moderate in severity,                          worsened compared to previous examinations.                          - Simple Endoscopic Score for Crohn's Disease: 12,                          mucosal inflammatory changes. Biopsied.   Recommendation:        - Await pathology results.                          - increase humira to weekly due to low levels                          - trial uceris as a bridge as we increse humira to                          avoid pred     Component 1 yr ago   Case Report Surgical Pathology                                Case: [id number]                                  Authorizing Provider:  Simin Vargas MD     Collected:           03/24/2023 1216              Ordering Location:     [facility]   Received:            03/27/2023 0812                                     [facility]                                                            Pathologist:           Gurmeet Hi MD                                                                Specimens:   A) - Colon, biopsy, Right Colon Bx (SF)                                                             B) - Colon, biopsy, Terminal Ileum Bx (SF)                                                         C) - Colon, biopsy, Transverse Colon Bx (SF)                                                       D) - Colon, biopsy, Left Colon Bx (SF)                                                             E) - Colon, biopsy, Rectal Bx (SF)                                                     Pathologic Diagnosis    A. Colon, right, biopsy:  Colonic mucosa with mild to moderate active inflammation.      B. Terminal ileum, biopsy:  Ileal mucosa with focal minimal to mild active inflammation.      C. Colon, transverse, biopsy:  Colonic mucosa with mild active inflammation.      D. Colon, left, biopsy:  Mild active colitis with increased lamina propria  lymphoplasmacytic inflammation and focal mucosal granuloma.      E. Rectum, biopsy:  Mild active proctitis with increased lamina propria lymphoplasmacytic inflammation and focal mucosal granuloma.      See comment.    Electronically signed by Gurmeet Hi MD on 4/3/2023 at 12:59 PM   Comment    As per EMR, clinical history of Crohn's disease/IBD is noted. No dysplasia is identified.        Last MRE/CTE: MRI 2013 - distal ileal mild thickening, left colon collapse, renal cysts  CRP level: 2.0 on 11/1/2023  Calprotectin level: 1,130 on 2/16/2023  Extraintestinal Manifestations:  none currently; prior notes from OSH indicate h/o fatigue, knee pains, and small mouth aphthous ulcers   Past Surgery:   none    Past Medications:  humira from 2016-17 - no antibodies to humira, stopped due to injection site pain (significant fear of taking injection due to pain, feels he could do citrate free) , no methotrexate since 2019 (has been on both po/sc) stopped after no antibodies. Inflectra 800mg q6 wqks (switched back to Humira due to convenience/cost), Enteral nutrition   Current Medications:  adalimumab (Humira *CF*) 40 mg once weekly  Drug Levels: ADA level 17.8 micrograms/mL   5/26/2023 (on weekly dosing), ADA level 4.7 micrograms/mL   3/10/2023 (on q2 week dosing)  Biologics: adalimumab currently, infliximab previously (no antibiotics, switched due to cost)  Prior TPMPT (N/A), prior thiopurine level (N/A)  History of tobacco:  no  History of c-diff:  no    Cancer Screening:  Next dysplasia screening is recommended:  [id number], but plan to update colonoscopy in the next 3-6 months to re-evaluate disease activity    Recommend all IBD patients on biologic therapy meet with our medical therapy management team to review health maintenance needs at least yearly.      ASSESSMENT/PLAN:    Fransisco Mosqueda is a 24 year old male who presents for evaluation of ileocolonic inflammatory Crohn's disease with possible UGI CD from review of  prior outside documentation. Currently doing well clinically on Humira 40 mg weekly. Last colonoscopy in 3/2023 at [facility] showed mild to moderately active pancolitis with a normal examined TI in the setting of being off Humira and he had a low drug level at that time.  Repeat ADA level on the weekly dosing interval was improved to 17.8 with no antibodies. He was supposed to follow up for repeat colonoscopy in the Spring of 2024 to re-evaluate disease activity, but was lost to follow up due to moving to [place] in June 2024. Will plan to have him continue Humira 40 mg weekly and will update colonoscopy (in approximately 6 months as he will need to arrange a ride, possibly from someone out of town).  We will then be able to ensure endoscopic and histologic remission. Will also update standard labs, fecal calprotectin next available. Of note, Quant-TB Gold on 7/15/2024 was negative so this is up to date. Given the question of UGI CD, will obtain MR enterography next available. He is not experiencing any upper GI symptoms at this time so will defer EGD. However, should symptoms arise, can plan for EGD at the time of colonoscopy. Lastly, will refer patient to [facility] pharmacy to ensure health maintenance is up to date.     RTC 6-12 months depending on Cscope findings.     Dysplasia surveillance: every 2-3 years, starting after next colonoscopy    Scribe Disclosure:   Camille FUNES PA-C, am serving as a scribe; to document services personally performed by Kelvin De La Paz MD -based on data collection and the provider's statements to me.     Provider Disclosure:  I agree with above History, Review of Systems, Physical exam and Plan.  I have reviewed the content of the documentation and have edited it as needed. I have personally performed the services documented here and the documentation accurately represents those services and the decisions I have made.      Electronically signed by:  Kelvin Smith  MD Brain      Thank you for this consultation.  It was a pleasure to participate in the care of this patient; please contact us with any further questions.  A total of 60 minutes was spent with reviewing the chart, discussing with the patient, documentation and coordination of care on the day of this encounter.    This note was created with voice recognition software, and while reviewed for accuracy, typos may remain.     Kelvin De La Paz MD  Adjunct  of Medicine  Division of Gastroenterology, Hepatology and Nutrition  [facility]  [phone]      Again, thank you for allowing me to participate in the care of your patient.        Sincerely,        Kelvin De La Paz MD

## 2024-09-24 NOTE — PATIENT INSTRUCTIONS
It was a pleasure meeting with you today and discussing your healthcare plan. Below is a summary of what we covered:    Please obtain labs (blood and stool tests) at your convenience. Orders are in your chart to have these done at any local [facility].    Schedule a MR enterography to evaluate the bowel at your convenience. The order is in to schedule.    We will plan for a colonoscopy in the next 3-6 months to re-evaluate for Crohn's disease activity given that you had some inflammation noted on your last colonoscopy in March 2023. Someone will contact you to schedule.    Plan for labs every 3 months for routine monitoring while on Humira.     We will set you up to visit with one of the Medication Therapy Management (MTM) pharmacists to keep on track with routine health maintenance.    Follow up in the GI clinic in 6-12 months, depending on the results of your next colonoscopy.     Please see below for any additional questions and scheduling guidelines.    Sign up for [employer]: [employer] patient portal serves as a secure platform for accessing your medical records from the [facility]. Additionally, [employer] facilitates easy, timely, and secure messaging with your care team. If you have not signed up, you may do so by using the provided code or calling [phone].    Coordinating your care after your visit:  There are multiple options for scheduling your follow-up care based on your provider's recommendation.    How do I schedule a follow-up clinic appointment:   After your appointment, you may receive scheduling assistance with the Clinic Coordinators by having a seat in the waiting room and a Clinic Coordinator will call you up to schedule.  Virtual visits or after you leave the clinic:  Your provider has placed a follow-up order in the [employer] portal for scheduling your return appointment. A member of the scheduling team will contact you to schedule.  [employer] Scheduling: Timely scheduling  through [employer] is advised to ensure appointment availability.   Call to schedule: You may schedule your follow-up appointment(s) by calling [phone], option 1.    How do I schedule my endoscopy or colonoscopy procedure:  If a procedure, such as a colonoscopy or upper endoscopy was ordered by your provider, the scheduling team will contact you to schedule this procedure. Or you may choose to call to schedule at   [phone], option 2.  Please allow 20-30 minutes when scheduling a procedure.    How do I get my blood work done? To get your blood work done, you need to schedule a lab appointment at an [facility]. There are multiple ways to schedule:   At the clinic: The Clinic Coordinator you meet after your visit can help you schedule a lab appointment.   [employer] scheduling: [employer] offers online lab scheduling at all [facility] laboratory locations.   Call to schedule: You can call [phone] to schedule your lab appointment.    How do I schedule my imaging study: To schedule imaging studies, such as CT scans, ultrasounds, MRIs, or X-rays, contact Imaging Services at [phone].    How do I schedule a referral to another doctor: If your provider recommended a referral to another specialist(s), the referral order was placed by your provider. You will receive a phone call to schedule this referral, or you may choose to call the number attached to the referral to self-schedule.    For Post-Visit Question(s):  For any inquiries following today's visit:  Please utilize [employer] messaging and allow 48 hours for reply or contact the Call Center during normal business hours at [phone], option 3.  For Emergent After-hours questions, contact the On-Call GI Fellow through the [facility] at [phone].  In addition, you may contact your Nurse directly using the provided contact information.    Test Results:  Test results will be accessible via [employer] in compliance  with the 21st Century Cures Act. This means that your results will be available to you at the same time as your provider. Often you may see your results before your provider does. Results are reviewed by staff within two weeks with communication follow-up. Results may be released in the patient portal prior to your care team review.    Prescription Refill(s):  Medication prescribed by your provider will be addressed during your visit. For future refills, please coordinate with your pharmacy. If you have not had a recent clinic visit or routine labs, for your safety, your provider may not be able to refill your prescription.

## 2024-10-10 ENCOUNTER — TELEPHONE (OUTPATIENT)
Dept: UROLOGY | Facility: CLINIC | Age: 24
End: 2024-10-10
Payer: COMMERCIAL

## 2024-10-10 NOTE — TELEPHONE ENCOUNTER
[employer] message with scheduling tool sent to schedule MTM visit.     Shauna Hanson, PharmD    Medication Therapy Management Pharmacist  [facility]   [phone]

## 2024-10-10 NOTE — TELEPHONE ENCOUNTER
MTM appointment cancelled, we made one more attempt to reschedule.     Routing back to referring provider and MTM Pharmacist Team    [facility]

## 2024-10-11 ENCOUNTER — VIRTUAL VISIT (OUTPATIENT)
Dept: PHARMACY | Facility: CLINIC | Age: 24
End: 2024-10-11
Attending: PHARMACIST
Payer: COMMERCIAL

## 2024-10-11 DIAGNOSIS — K50.819 CROHN'S DISEASE OF SMALL AND LARGE INTESTINES WITH COMPLICATION (H): Primary | ICD-10-CM

## 2024-10-11 RX ORDER — VITAMIN B COMPLEX
1 TABLET ORAL DAILY
COMMUNITY

## 2024-10-11 NOTE — PROGRESS NOTES
Medication Therapy Management (MTM) Encounter    ASSESSMENT:                            Medication Adherence/Access: No issues identified.  Utilizes [facility].  Reports no missed doses and is taking appropriately.     Crohn's Disease:  Fransisco would benefit from continued treatment with Humira (Adalimumab) 40 mg every 7 days.  He is up to date but will be due in the next 1-2 weeks on routine maintenance labs and is up to date on annual tuberculosis screening.  Of note, he does not have standing orders on file, will place.  He is indicated for additional lab work including fecal calprotectin he will return in near future. No access issues for his advanced therapy are present.  He is up to date on his vaccinations and in the process of completing HPV and Hep B series.  He feels he is getting adequate calcium from diet and currently supplements with vitamin D daily.  He recently had low levels of Vitamin B12 and began supplementing, he will be more consistent with taking daily.  Recommend monitoring B12 levels with routine labs in January. He is not indicated for a DEXA scan at this time and I recommend continued monitoring for need. Reminders for routine cancer screening were provided, he requested referral for dermatology.      PLAN:                             You will be due for your routine every 3 months labs in the next week or two, please schedule lab appointment to complete these  - Can return your fecal calprotectin sample when you go to lab    Reminder to complete the third and final doses of HPV and Hep B vaccine series  - You may have these already scheduled    I've placed a referral to dermatology for a routine annual skin check. If you don't hear from a  in 2-3 business days, you can call [phone] to schedule an appointment    Take Vitamin B and Vitamin D supplements daily to achieve and maintain in range levels   - recommend checking Vitamin B levels with  your routine labs that are due in January    Follow-up: 6 month Sam HUTCHINS message with scheduling tool one month prior to visit due    SUBJECTIVE/OBJECTIVE:                          Fransisco Mosqueda is a 24 year old male seen for an initial visit. He was referred to me from Dr. Kelvin De La Paz MD and Camille Pérez PA-C.      Reason for visit: Humira (Adalimumab) and Annual IBD Health Maintenance Review.    Allergies/ADRs: Reviewed in chart  Past Medical History: Reviewed in chart  Tobacco: He reports that he has never smoked. He has never used smokeless tobacco.  Alcohol: not currently using    Medication Adherence/Access: no issues reported.  Uses specific location in fridge as reminder, same day of the week Sunday for dose reminders, and [facility] calls / texts for refill reminders. Reports no missed doses and is taking appropriately.     Crohn's Disease:  - Cyanocobalamin 1,000 mcg daily started after labs resulted low but has not been consistent in taking  - Vitamin D 1,000 units daily  - Humira (Adalimumab) 40 mg every 7 days   Original start date:  2016, escalated 3/2023  Pharmacy:  [facility]   Last dose:  10/7/24  Next dose: 10/14/24  Treatment Goal:  Keep Crohn's Disease in remission    [facility] worked with [facility] pharmacist. No injection site reactions or side effects with Humira (Adalimumab).  Discussed fatigue could also be related to low vitamin B or vitamin D levels. Expressed has not been consistently taking vitamin B but will consider given low levels.     GI symptoms:  -- In the past 2 weeks:    - slight fatigue is ongoing, very few days he feels 100%, no other symptoms    Lab Results   Component Value Date    VITDT 22 07/15/2024    B12 211 (L) 07/15/2024     PRO-3 for Crohn's Disease    Please select the one best answer for the patient's ability at this time     Over the past week, how many liquid or soft stools have you had on average per day?  "  0 (When scoring, multiply number by 2)   Over the past week, please rate your average abdominal pain  None : 0 points  3. Over the past week, please rate your general well-being    Slightly Under Par: 7 points    Score: 7  <13: Remission  13-21: Mild Activity   22-52: Moderate Activity  >/= 53: Severe Activity     Last provider visit: 24 Dr. Kelvin De La Paz MD   Next provider visit: not yet scheduled, RTC 6-12 months depending on Cscope findings.   Last labs completed: 7/15/24  Lab frequency: every 3 months   - standing labs not on file    Next labs due: 10/15/24    Last TB screenin/15/2024  PDC: 21%, not accurate, recently moved to [place], previous fills not registering into PDC    Disease/Date of Dx:     Extent/Location:   ileocolonic / ? UGI CD (mild)   From documentation in Care Everywhere dated 2022:   \" - erosion at pylorus, rectal ulcers   - (Pre TNF) - duodenal erosions with surrounding erythema/ inflammation in descending colon, sigmoid, rectum with erythema/edema/exudate but no deep ulcers, ileocecal valve inflammation , TI w/ erythema/petichiae/edema, frability  - normal EGD/colon on infliximab\"  Past Medications:  humira from - - no antibodies to humira, stopped due to injection site pain (significant fear of taking injection due to pain, feels he could do citrate free) , no methotrexate since  (has been on both po/sc) stopped after no antibodies. Inflectra 800mg q6 wqks (switched back to Humira due to convenience/cost), Enteral nutrition   Current Medications:  adalimumab (Humira *CF*) 40 mg once weekly  Drug Levels: ADA level 17.8 micrograms/mL   2023 (on weekly dosing), ADA level 4.7 micrograms/mL   3/10/2023 (on q2 week dosing)  Biologics: adalimumab currently, infliximab previously (no antibiotics, switched due to cost)  Prior TPMPT (N/A), prior thiopurine level (N/A)  History of tobacco:  no  History of c-diff:  no    IBD Health " Maintenance    Vaccinations:  All patients on immunosuppression should avoid live vaccines unless specifically indicated.  Born in [place], lived in [place] for two years  -- Influenza (every year) last 2024  -- TdaP (every 10 years) last 7/2024  -- Pneumococcal Pneumonia    Prevnar-13: not on file    Pneumovax-23: not on file    Prevnar-20: 5/10/23  -- COVID-19 last 2024    One time confirmation of immunity or serologies:  -- Hepatitis A (serologies or immunizations) vaccine x2 2024  -- Hepatitis B (serologies or immunizations) vaccine x2 2004, 3rd dose scheduled   - serologies indicate immunity 2022 [facility]   -- Varicella/Zoster    Varicella vaccine x2,    Zoster vaccine x2, 2/13/23, 5/10/23  -- MMR vaccine x2, 2001, 2005   -- HPV (all aged 18-26) vaccine x2 2024  -- Meningococcal meningitis (all patients at risk for meningitis)-- recalls having prior to college    Due to the immunosuppression in this patient, I would not advise administration of live vaccines such as varicella/VZV, intranasal influenza, MMR, or yellow fever vaccine (if traveling).      Immunosuppressive Screening:  -- Hep B Surface Antibody serologies indicate immunity  149 mIU/mL - [facility] 9/2022  -- Hep B Surface Antigen non-reactive  [facility] 9/2022  -- Hep B Core Antibody non-reactive  [facility] 9/2022  -- Hep C Antibody non-reactive   -- Yearly assessment of TB Negative    Lab Results   Component Value Date    HCVAB Nonreactive 07/15/2024    TBRES Negative 07/15/2024     Bone mineral density screening   -- Recommend all patients supplement with calcium and vitamin D   - feels he is getting adequate calcium from diet   -- Given minimal prior steroid use recommend continued monitoring for DEXA need   - does not recall recent or history of prednisone     Cancer Screening:  Colon cancer screening:  Given colonic, recommend patient undergo regular dysplasia surveillance   Next dysplasia screening is recommended:  [id number],  but plan to update colonoscopy in the next 3-6 months to re-evaluate disease activity     Skin cancer screening: Annual visual exam of skin by dermatologist since patient is immunocompromised, has not met with dermatologist or had full body skin check, would like dermatology referral      Depression Screening:    PHQ-2 Score:         9/24/2024     2:08 PM 7/15/2024     8:07 AM   PHQ-2 ( 1999 [employer])   Q1: Little interest or pleasure in doing things 0 1   Q2: Feeling down, depressed or hopeless 0 1   PHQ-2 Score 0 2   Q1: Little interest or pleasure in doing things  Several days   Q2: Feeling down, depressed or hopeless  Several days   PHQ-2 Score  2     Research:  Are you interested in being contacted about enrollment in clinical research studies? Yes    Would you like to receive a quarterly newsletter on research via email. YES [email or web address]       Misc:  -- Avoid tobacco use  -- Avoid NSAIDs as there is potentially a 25% chance of causing an IBD flare    ----------------    I spent 48 minutes with this patient today. All changes were made via collaborative practice agreement with Camille Pérez and Dr. Kelvin De La Paz MD. A copy of the visit note was provided to the patient's provider(s).    A summary of these recommendations was sent via [employer].    Shauna Hanson, PharmD    Medication Therapy Management Pharmacist  [facility]   [phone]    Telemedicine Visit Details  The patient's medications can be safely assessed via a telemedicine encounter.  Type of service:  Telephone visit  Originating Location (pt. Location): Home  Distant Location (provider location):  Off-site  Start Time: 2:09 PM  End Time: 2:57 PM     Medication Therapy Recommendations  No medication therapy recommendations to display

## 2024-10-11 NOTE — NURSING NOTE
Current patient location:  Pt states he is currently at work.    Is the patient currently in the state of [place]? YES    Visit mode:TELEPHONE    If the visit is dropped, the patient can be reconnected by: TELEPHONE VISIT: Phone number:   Telephone Information:   Mobile [phone]       Will anyone else be joining the visit? NO  (If patient encounters technical issues they should call [phone]    Are changes needed to the allergy or medication list? Pt stated no changes to allergies and Pt stated no med changes    Are refills needed on medications prescribed by this physician? NO    Rooming Documentation:  Not applicable    Reason for visit: Consult    Katelynn WATKINS

## 2024-10-16 NOTE — PATIENT INSTRUCTIONS
"Recommendations from today's MTM visit:                                                       You will be due for your routine every 3 months labs in the next week or two, please schedule lab appointment to complete these  - Can return your fecal calprotectin sample when you go to lab    Reminder to complete the third and final doses of HPV and Hep B vaccine series  - You may have these already scheduled    I've placed a referral to dermatology for a routine annual skin check. If you don't hear from a  in 2-3 business days, you can call [phone] to schedule an appointment    Take Vitamin B and Vitamin D supplements daily to achieve and maintain in range levels   - recommend checking Vitamin B levels with your routine labs that are due in January    Follow-up: 6 month MTM, [employer] message with scheduling tool one month prior to visit due    It was great speaking with you today.  I value your experience and would be very thankful for your time in providing feedback in our clinic survey. In the next few days, you may receive an email or text message from [employer] with a link to a survey related to your  clinical pharmacist.\"     To schedule another MTM appointment, please call the clinic directly or you may call the MTM scheduling line at [phone] or toll-free at [phone].     My Clinical Pharmacist's contact information:                                                      Please feel free to contact me with any questions or concerns you have.      Shauna Hanson, PharmD    Medication Therapy Management Pharmacist  [facility]   [phone]     "

## 2024-10-29 ENCOUNTER — HOSPITAL ENCOUNTER (OUTPATIENT)
Dept: MRI IMAGING | Facility: CLINIC | Age: 24
Discharge: HOME | End: 2024-10-29
Attending: PHYSICIAN ASSISTANT | Admitting: PHYSICIAN ASSISTANT
Payer: COMMERCIAL

## 2024-10-29 DIAGNOSIS — K50.819 CROHN'S DISEASE OF SMALL AND LARGE INTESTINES WITH COMPLICATION (H): ICD-10-CM

## 2024-10-29 PROCEDURE — 72197 MRI PELVIS W/O & W/DYE: CPT

## 2024-10-29 PROCEDURE — A9585 GADOBUTROL INJECTION: HCPCS | Performed by: PHYSICIAN ASSISTANT

## 2024-10-29 PROCEDURE — 255N000002 HC RX 255 OP 636: Performed by: PHYSICIAN ASSISTANT

## 2024-10-29 PROCEDURE — 74183 MRI ABD W/O CNTR FLWD CNTR: CPT | Mod: 26 | Performed by: RADIOLOGY

## 2024-10-29 PROCEDURE — 250N000011 HC RX IP 250 OP 636: Mod: JZ | Performed by: PHYSICIAN ASSISTANT

## 2024-10-29 PROCEDURE — 72197 MRI PELVIS W/O & W/DYE: CPT | Mod: 26 | Performed by: RADIOLOGY

## 2024-10-29 RX ORDER — GADOBUTROL 604.72 MG/ML
10 INJECTION INTRAVENOUS ONCE
Status: COMPLETED | OUTPATIENT
Start: 2024-10-29 | End: 2024-10-29

## 2024-10-29 RX ADMIN — GLUCAGON 1 MG: KIT at 09:44

## 2024-10-29 RX ADMIN — GADOBUTROL 8 ML: 604.72 INJECTION INTRAVENOUS at 10:24

## 2024-12-06 ENCOUNTER — MYC MEDICAL ADVICE (OUTPATIENT)
Dept: FAMILY MEDICINE | Facility: CLINIC | Age: 24
End: 2024-12-06
Payer: COMMERCIAL

## 2024-12-06 DIAGNOSIS — U07.1 INFECTION DUE TO 2019 NOVEL CORONAVIRUS: Primary | ICD-10-CM

## 2024-12-09 NOTE — TELEPHONE ENCOUNTER
RN COVID TREATMENT VISIT  12/09/24      The patient has been triaged and does not require a higher level of care.    Fransisco Mosqueda  24 year old  Current weight? 185    Has the patient been seen by a primary care provider at an [facility] or [facility] within the past two years? Yes.   Have you been in close proximity to/do you have a known exposure to a person with a confirmed case of influenza? No.     General treatment eligibility:  Date of positive COVID test (PCR or at home)?  12/6/24    Are you or have you been hospitalized for this COVID-19 infection? No.   Have you received monoclonal antibodies or antiviral treatment for COVID-19 since this positive test? No.   Do you have any of the following conditions that place you at risk of being very sick from COVID-19?   - Patient reports taking medicines that suppress the immune system such as: Greater than or equal to 20mg prednisone or equivalent per day, cyclophosphamide or cisplatin, methotrexate, cancer chemotherapeutic agents classified as severely immunosuppressive, tumor-necrosis (TNF) blockers, and other biologics  Yes, patient has at least one high risk condition as noted above.     Current COVID symptoms:   - cough  - shortness of breath or difficulty breathing  - fatigue  - sore throat  - congestion or runny nose  - nausea or vomiting  Yes. Patient has at least one symptom as selected.     How many days since symptoms started? 5 days or less. Established patient, 12 years or older weighing at least 88.2 lbs, who has symptoms that started in the past 5 days, has not been hospitalized nor received treatment already, and is at risk for being very sick from COVID-19.     Treatment eligibility by RN:  Are you currently pregnant or nursing? No  Do you have a clinically significant hypersensitivity to nirmatrelvir or ritonavir, or toxic epidermal necrolysis (TEN) or Sánchez-Alexander Syndrome? No  Do you have a history of  hepatitis, any hepatic impairment on the Problem List (such as Child-Carlisle Class C, cirrhosis, fatty liver disease, alcoholic liver disease), or was the last liver lab (hepatic panel, ALT, AST, ALK Phos, bilirubin) elevated in the past 6 months? No  Do you have any history of severe renal impairment (eGFR < 30mL/min)? No    Is patient eligible to continue? Yes, patient meets all eligibility requirements for the RN COVID treatment (as denoted by all no responses above).     Current Outpatient Medications   Medication Sig Dispense Refill    adalimumab (HUMIRA *CF*) 40 MG/0.4ML pen kit Inject 0.4 mLs (40 mg) subcutaneously every 7 days. 4 each 5    cyanocobalamin (VITAMIN B-12) 1000 MCG tablet Take 1 tablet (1,000 mcg) by mouth daily 90 tablet 3    Vitamin D3 (VITAMIN D, CHOLECALCIFEROL,) 25 mcg (1000 units) tablet Take 1 tablet by mouth daily.         Medications from List 1 of the standing order (on medications that exclude the use of Paxlovid) that patient is taking: NONE. Is patient taking Hillrose's Wort? No  Is patient taking Hillrose's Wort or any meds from List 1? No.   Medications from List 2 of the standing order (on meds that provider needs to adjust) that patient is taking: NONE. Is patient on any of the meds from List 2? No.   Medications from List 3 of standing order (on meds that a RN needs to adjust) that patient is taking: NONE. Is patient on any meds from List 3? No.     Paxlovid has an approximate 90% reduction in hospitalization. Paxlovid can possibly cause altered sense of taste, diarrhea (loose, watery stools), high blood pressure, muscle aches.     Would patient like a Paxlovid prescription?   Yes.   Lab Results   Component Value Date    GFRESTIMATED >90 07/15/2024       Was last eGFR reduced? No, eGFR 60 or greater/ No Result on record. Patient can receive the normal renal function dose. [facility]    Temporary change to home medications: no change    All  medication adjustments (holds, etc) were discussed with the patient and patient was asked to repeat back (teachback) their med adjustment.  Did patient understand med adjustment? No medication adjustments needed.         Reviewed the following instructions with the patient:    Paxlovid (nimatrelvir and ritonavir)    How it works  Two medicines (nirmatrelvir and ritonavir) are taken together. They stop the virus from growing. Less amount of virus is easier for your body to fight.    How to take  Medicine comes in a daily container with both medicine tablets. Take by mouth twice daily (once in the morning, once at night) for 5 days.  The number of tablets to take varies by patient.  Don't chew or break capsules. Swallow whole.    When to take  Take as soon as possible after positive COVID-19 test result, and within 5 days of your first symptoms.    Possible side effects  Can cause altered sense of taste, diarrhea (loose, watery stools), high blood pressure, muscle aches.    Patricia Mora RN

## 2024-12-10 ENCOUNTER — TELEPHONE (OUTPATIENT)
Dept: GASTROENTEROLOGY | Facility: CLINIC | Age: 24
End: 2024-12-10
Payer: COMMERCIAL

## 2024-12-10 NOTE — TELEPHONE ENCOUNTER
"Endoscopy Scheduling Screen    Have you had any respiratory illness or flu-like symptoms in the last 10 days?  Yes (Schedule at least 10 days from symptom onset)    What is your communication preference for Instructions and/or Bowel Prep?   MyChart    What insurance is in the chart?  Other:  [employer]    Ordering/Referring Provider: Camille Pérez PA-C   (If ordering provider performs procedure, schedule with ordering provider unless otherwise instructed. )    BMI: Estimated body mass index is 25.6 kg/m  as calculated from the following:    Height as of 9/24/24: 1.791 m (5' 10.5\").    Weight as of 9/24/24: 82.1 kg (181 lb).     Sedation Ordered  moderate sedation.   If patient BMI > 50 do not schedule in ASC.    If [facility] > 45 [facility]    Are you taking methadone or Suboxone?  NO, No RN review required.    Have you been diagnosed and are being treated for severe PTSD or severe anxiety?  NO, No RN review required.    Are you taking any prescription medications for pain 3 or more times per week?   NO, No RN review required.    Do you have a history of malignant hyperthermia?  No    (Females) Are you currently pregnant?   No     Have you been diagnosed or told you have pulmonary hypertension?   No    Do you have an LVAD?  No    Have you been told you have moderate to severe sleep apnea?  No.    Have you been told you have COPD, asthma, or any other lung disease?  No    Do you have any heart conditions?  No     Have you ever had or are you waiting for an organ transplant?  No. Continue scheduling, no site restrictions.    Have you had a stroke or transient ischemic attack (TIA aka \"mini stroke\" in the last 6 months?   No    Have you been diagnosed with or been told you have cirrhosis of the liver?   No.    Are you currently on dialysis?   No    Do you need assistance transferring?   No    BMI: Estimated body mass index is 25.6 kg/m  as calculated from the following:    Height as of 9/24/24: 1.791 " "m (5' 10.5\").    Weight as of 9/24/24: 82.1 kg (181 lb).     Is patients BMI > 40 and scheduling location [facility]?  No    Do you take an injectable or oral medication for weight loss or diabetes (excluding insulin)?  No    Do you take the medication Naltrexone?  No    Do you take blood thinners?  No       Prep   Are you currently on dialysis or do you have chronic kidney disease?  No    Do you have a diagnosis of diabetes?  No    Do you have a diagnosis of cystic fibrosis (CF)?  No    On a regular basis do you go 3 -5 days between bowel movements?  No    BMI > 40?  No    Preferred Pharmacy:    [id number] [facility] - [place], [place] - [id number] [place] [address]  Phone: [phone] Fax: [phone]      Final Scheduling Details     Procedure scheduled  Colonoscopy    Surgeon:  Brain     Date of procedure:  02/21/2025     Pre-OP / PAC:   No - Not required for this site.    Location  [facility] - Patient preference.    Sedation   Moderate Sedation - Per order.      Patient Reminders:   You will receive a call from a Nurse to review instructions and health history.  This assessment must be completed prior to your procedure.  Failure to complete the Nurse assessment may result in the procedure being cancelled.      On the day of your procedure, please designate an adult(s) who can drive you home stay with you for the next 24 hours. The medicines used in the exam will make you sleepy. You will not be able to drive.      You cannot take public transportation, ride share services, or non-medical taxi service without a responsible caregiver.  Medical transport services are allowed with the requirement that a responsible caregiver will receive you at your destination.  We require that drivers and caregivers are confirmed prior to your procedure.  "

## 2025-01-23 ENCOUNTER — TELEPHONE (OUTPATIENT)
Dept: GASTROENTEROLOGY | Facility: CLINIC | Age: 25
End: 2025-01-23

## 2025-01-23 DIAGNOSIS — K50.819 CROHN'S DISEASE OF SMALL AND LARGE INTESTINES WITH COMPLICATION (H): Primary | ICD-10-CM

## 2025-01-23 RX ORDER — ADALIMUMAB-BWWD 40 MG/.4ML
40 SOLUTION SUBCUTANEOUS WEEKLY
Qty: 1.6 ML | Refills: 2 | OUTPATIENT
Start: 2025-01-23

## 2025-01-23 NOTE — TELEPHONE ENCOUNTER
Last provider visit: 2024 with Dr. De La Paz  Next provider visit: not on file  Last labs completed: 2024  Lab frequency: every 3 months   - standing labs available until 10/2025  Next labs due: now  Last TB screenin2025    Humira changed to Hadlima per payor request. Order placed per CPA with Dr. De La Paz. Will send additional reminder about labs.

## 2025-01-23 NOTE — TELEPHONE ENCOUNTER
PA Initiation    Medication: HADLIMA PUSHTOUCH 40 MG/0.4ML SC SOAJ  Insurance Company: [employer] - Phone [phone] Fax [phone]  Pharmacy Filling the Rx: [facility] - [place], [place] - [address]  Filling Pharmacy Phone:    Filling Pharmacy Fax:    Start Date: 1/23/2025  [id number]

## 2025-01-23 NOTE — LETTER
2025    To:       RE:   Fransisco Mosqueda  [address]  [place] [address]  : 2000  Policy #: [id number]   Case/Reference: [id number]    To Whom It May Concern,    Below is the clinical summary pertinent to the appeal of HADLIMA PUSHTOUCH 40MG/0.4ML AUTO INJECTOR inject 40 mg every 7 days. We understand that you are denying our request because the requested frequency is more than the FDA recommended dosing.    Background   Diagnosis: Crohn's disease of small and large intestines with complication (H) [K50.819]   Current IBD medications:   - Humira 40 mg subcutaneously every 7 days    Relevant Drug Levels  ADA level 17.8 micrograms/mL 2023 (on weekly dosing)  ADA level 4.7 micrograms/mL 3/10/2023 (on q2 week dosing)     Previous IBD Therapies:  Remicade   Inflectra    Clinical disease assessment on current medications: Currently doing well clinically on Humira 40 mg weekly.    Objective measures of inflammation:    - Flex-sig/colonoscopy: - Preparation of the colon was fair.                          - The examined portion of the ileum was normal.                          Biopsied.                          - Crohn's disease. Inflammation was found from the                          rectum to the cecum. This was moderate in severity,                          worsened compared to previous examinations.                          - Simple Endoscopic Score for Crohn's Disease: 12,                          mucosal inflammatory changes. Biopsied.          Rationale for requested therapy    Fransisco Mosqueda is 24 year old male who follows with us for management of Crohn's disease. He was diagnosed in  when he was losing weight and experiencing significant diarrhea and fatigue in middle school. Records indicate ileocolonic Crohn's with possible gastroduodenal involvement as well (see below). Initially treated with enteral nutrition/liquid diet in 9th grade. Then started on  Humira + methotrexate but did not do well with self-injections. Switched to Remicade, then later Inflectra due to injection pain. He was switched back to Humira due to insurance/cost in the Fall of 2022 and had been on this 40 mg every two weeks until he experienced a symptom flare in February 2023. Infectious workup was negative. CRP on 2/10/23 was 2.2. Fecal calprotectin on 2/16/23 was 1,130. Adalimumab level on 3/10/2023 was 4.7 (no antibodies) so his dose was increased to 40 mg weekly. Colonoscopy on 3/24/23 showed moderate inflammation from the rectum to the cecum; the examined portion of the ileum appeared normal. Biopsies showed  mild to moderately active pancolitis. Repeat ADA level on 5/26/2023 was 17.8 (no antibodies) - he believes this was a true trough level. Repeat CRP was 1.0. Last outpatient GI visit at [place] was on 11/1/2023 at which time he was doing well on weekly dosing. He was recommended to continue the same regimen and follow up for colonoscopy in the Spring of 2024 to re-evaluate disease status.     His follow up was not completed since he ended up moving to [place] in June 2024. He has continued on his weekly Humira and currently feels well. His bowel movements are regular. Typically has 2-3 formed stools per day. He denies abdominal pain, diarrhea, or blood in the stool. Weight has been stable to slight increase due to working sedentary job. He denies any joint pain, skin issues, or eye/vision concerns. Given this, we would like him to continue with his current therapy at this time. He has a colonoscopy scheduled for endoscopic assessment of disease activity next month.    We would like to point out that this request was previously approved for Humira. We were asked to update his prescribed product to your preferred formulary alternative, Hadlima, and in doing so are now unable to get him his next dose. We ask for your timely determination in this matter as to not further delay care for  Mr. Mosqueda.    Duration  12 months    Summary  In summary, HADLIMA PUSHTOUCH 40MG/0.4ML AUTO INJECTOR inject 40 mg every 7 days is medically necessary for this patient s medical condition. Please call my office at [phone] if I can provide you with any additional information to approve my request. I look forward to receiving your timely response and approval of this request.     Sincerely,    Kelvin De La Paz MD  Adjunct  of Medicine  Division of Gastroenterology, Hepatology and Nutrition  [facility]  [phone]     And    Anastasiia SaucedaD, BCACP  MTM Pharmacist   [facility]enter[facility]   Phone: [phone]

## 2025-01-23 NOTE — TELEPHONE ENCOUNTER
Routed LMN to MTM pool    PRIOR AUTHORIZATION DENIED    Medication: HADLIMA PUSHTOUCH 40 MG/0.4ML SC SOAJ  Insurance Company: [employer] - Phone [phone] Fax [phone]  Denial Date: 1/23/2025  Denial Reason(s):      Appeal Information:      Patient Notified:

## 2025-01-28 NOTE — TELEPHONE ENCOUNTER
Medication Appeal Initiation    Medication: HADLIMA PUSHTOUCH 40 MG/0.4ML SC SOAJ  Appeal Start Date:  1/28/2025  Insurance Company: david barnes  Insurance Phone:    Insurance Fax: [phone]  Comments:     Faxed appeal letter

## 2025-01-28 NOTE — TELEPHONE ENCOUNTER
MEDICATION APPEAL APPROVED    Medication: HADLIMA PUSHTOUCH 40 MG/0.4ML SC SOAJ  Authorization Effective Date: 12/29/2024  Authorization Expiration Date: 1/28/2026  Approved Dose/Quantity: 4/28  Reference #: [id number]   Appeal Insurance Company:  julio [employer]  Expected CoPay: $     30-- $0 with copay card  CoPay Card Available: yes   Financial Assistance Needed:    Filling Pharmacy: [facility] - [place], [place] - [address]  Patient Notified:  yes left voicemail  Comments:

## 2025-02-10 ENCOUNTER — MYC MEDICAL ADVICE (OUTPATIENT)
Dept: FAMILY MEDICINE | Facility: CLINIC | Age: 25
End: 2025-02-10
Payer: COMMERCIAL

## 2025-02-10 NOTE — TELEPHONE ENCOUNTER
Pt called in.  Advised if he would like to come in to [facility] to be evaluated for this, we could make an appointment with one of our providers.     Also discussed list of walk-in orthopedic care clinic options:    [facility]: Same Day Access  [address]  [place] [address]  [phone]    [facility]:  Hours: 8 AM to 8 PM seven days a week.  Locations: [facility], [facility], [facility], [place], [place], [place]    Advised to call back if symptoms worsen or new symptoms arise. Advised to present to emergency department in the case of medical emergency. Red flag symptoms reviewed in detail.      Leanna MCGREGOR RN

## 2025-02-11 ENCOUNTER — PHARMACY VISIT (OUTPATIENT)
Dept: ADMINISTRATIVE | Facility: CLINIC | Age: 25
End: 2025-02-11
Payer: COMMERCIAL

## 2025-02-11 NOTE — PROGRESS NOTES
Crohn's/Ulcerative Colitis Clinical Follow Up Assessment  I spoke with Steven today for [facility] TM assessment.     Current Regimen: Hadlima 40mg every 7 days.     This is a Humira to Hadlima biosim switch. He was able to give first dose. Other than needing to push the autoinjector a little harder, he had no SE or ISR with first dose. He mentioned he as had some increased back pain but thinks this is more likely due to poor posture vs Hadlima. I let him know to monitor and reach out to clinic if continues or worsens.     He asked for a larger Sharps container. He stated the Hadlima pens are a lot bigger than the Humira and his current sharps will be full before next refill. I let him know I will send a large sharps via [employer] today.     Reviewed that most patients who have transitioned to biosims have had good success and very few if any changes to symptom control or tolerability. He appreciated this info. No further questions or concerns.     OK for TM to follow up in one month and then will hand back to MTM.       Alisha España, Pharm.D.[facility]  [address] [address]  [email or web address]  Office: [phone]

## 2025-02-13 ENCOUNTER — ALLIED HEALTH/NURSE VISIT (OUTPATIENT)
Dept: FAMILY MEDICINE | Facility: CLINIC | Age: 25
End: 2025-02-13
Payer: COMMERCIAL

## 2025-02-13 DIAGNOSIS — Z23 NEED FOR VACCINATION: Primary | ICD-10-CM

## 2025-02-21 ENCOUNTER — HOSPITAL ENCOUNTER (OUTPATIENT)
Facility: CLINIC | Age: 25
Discharge: HOME | End: 2025-02-21
Attending: INTERNAL MEDICINE | Admitting: INTERNAL MEDICINE
Payer: COMMERCIAL

## 2025-02-21 VITALS
RESPIRATION RATE: 17 BRPM | OXYGEN SATURATION: 93 % | HEART RATE: 67 BPM | SYSTOLIC BLOOD PRESSURE: 126 MMHG | DIASTOLIC BLOOD PRESSURE: 71 MMHG

## 2025-02-21 LAB — COLONOSCOPY: NORMAL

## 2025-02-21 PROCEDURE — 45380 COLONOSCOPY AND BIOPSY: CPT | Performed by: INTERNAL MEDICINE

## 2025-02-21 PROCEDURE — 88305 TISSUE EXAM BY PATHOLOGIST: CPT | Mod: 26 | Performed by: PATHOLOGY

## 2025-02-21 PROCEDURE — 99153 MOD SED SAME PHYS/QHP EA: CPT | Performed by: INTERNAL MEDICINE

## 2025-02-21 PROCEDURE — 250N000011 HC RX IP 250 OP 636: Performed by: INTERNAL MEDICINE

## 2025-02-21 PROCEDURE — G0500 MOD SEDAT ENDO SERVICE >5YRS: HCPCS | Performed by: INTERNAL MEDICINE

## 2025-02-21 PROCEDURE — 88305 TISSUE EXAM BY PATHOLOGIST: CPT | Mod: TC | Performed by: INTERNAL MEDICINE

## 2025-02-21 RX ORDER — ONDANSETRON 2 MG/ML
4 INJECTION INTRAMUSCULAR; INTRAVENOUS
Status: DISCONTINUED | OUTPATIENT
Start: 2025-02-21 | End: 2025-02-21 | Stop reason: HOSPADM

## 2025-02-21 RX ORDER — NALOXONE HYDROCHLORIDE 0.4 MG/ML
0.2 INJECTION, SOLUTION INTRAMUSCULAR; INTRAVENOUS; SUBCUTANEOUS
Status: DISCONTINUED | OUTPATIENT
Start: 2025-02-21 | End: 2025-02-21 | Stop reason: HOSPADM

## 2025-02-21 RX ORDER — ONDANSETRON 2 MG/ML
4 INJECTION INTRAMUSCULAR; INTRAVENOUS EVERY 6 HOURS PRN
Status: DISCONTINUED | OUTPATIENT
Start: 2025-02-21 | End: 2025-02-21 | Stop reason: HOSPADM

## 2025-02-21 RX ORDER — NALOXONE HYDROCHLORIDE 0.4 MG/ML
0.4 INJECTION, SOLUTION INTRAMUSCULAR; INTRAVENOUS; SUBCUTANEOUS
Status: DISCONTINUED | OUTPATIENT
Start: 2025-02-21 | End: 2025-02-21 | Stop reason: HOSPADM

## 2025-02-21 RX ORDER — LIDOCAINE 40 MG/G
CREAM TOPICAL
Status: DISCONTINUED | OUTPATIENT
Start: 2025-02-21 | End: 2025-02-21 | Stop reason: HOSPADM

## 2025-02-21 RX ORDER — PROCHLORPERAZINE MALEATE 5 MG/1
10 TABLET ORAL EVERY 6 HOURS PRN
Status: DISCONTINUED | OUTPATIENT
Start: 2025-02-21 | End: 2025-02-21 | Stop reason: HOSPADM

## 2025-02-21 RX ORDER — ONDANSETRON 4 MG/1
4 TABLET, ORALLY DISINTEGRATING ORAL EVERY 6 HOURS PRN
Status: DISCONTINUED | OUTPATIENT
Start: 2025-02-21 | End: 2025-02-21 | Stop reason: HOSPADM

## 2025-02-21 RX ORDER — FENTANYL CITRATE 50 UG/ML
INJECTION, SOLUTION INTRAMUSCULAR; INTRAVENOUS PRN
Status: DISCONTINUED | OUTPATIENT
Start: 2025-02-21 | End: 2025-02-21 | Stop reason: HOSPADM

## 2025-02-21 RX ORDER — FLUMAZENIL 0.1 MG/ML
0.2 INJECTION, SOLUTION INTRAVENOUS
Status: DISCONTINUED | OUTPATIENT
Start: 2025-02-21 | End: 2025-02-21 | Stop reason: HOSPADM

## 2025-02-21 ASSESSMENT — ACTIVITIES OF DAILY LIVING (ADL)
ADLS_ACUITY_SCORE: 41

## 2025-02-21 NOTE — OR NURSING
Procedure: Colonoscopy with bx   Sedation: conscious sedation   Specimens: x3 jars, sent to lab.   O2: 2L/NC  Tolerated procedure: well  Pt to recovery area in stable condition accompanied by RN.   Other:  none    Vanadna Jaimes RN

## 2025-02-21 NOTE — H&P
ENDOSCOPY PRE-SEDATION H&P FOR OUTPATIENT PROCEDURES    Fransisco Mosqueda  [id number]  2000    Procedure: colonoscopy    Pre-procedure diagnosis: crohn's disease    Past medical history: No past medical history on file.    Past surgical history:   Past Surgical History:   Procedure Laterality Date    COLONOSCOPY         No current facility-administered medications for this encounter.       Allergies   Allergen Reactions    Omnicef [Cefdinir] Rash     As a baby       History of Anesthesia/Sedation Problems: no    PHYSICAL EXAMINATION:  Constitutional: aaox3, cooperative, pleasant  Vitals reviewed: BP (!) 136/94 (BP Location: Left arm)   Resp 16   SpO2 95%   Wt:   Wt Readings from Last 2 Encounters:   09/24/24 82.1 kg (181 lb)   07/15/24 88.2 kg (194 lb 8 oz)      Eyes: Sclera anicteric/injected  Ears/nose/mouth/throat: Normal oropharynx without ulcers or exudate, mucus membranes moist, hearing intact  Neck: supple, thyroid normal size  CV: No edema  Respiratory: Unlabored breathing  Lymph: No submandibular, supraclavicular or inguinal lymphadenopathy  Abd: Nondistended, no masses, nontender  Skin: warm, perfused, no jaundice  Psych: Normal affect  MSK: normal movement on limited exam.    ASA Score: See Provation note    Assessment/Plan:     The patient is an appropriate candidate to receive sedation.    Informed consent was discussed with the patient/family, including the risks, benefits, potential complications and any alternative options associated with sedation.    Patient assessment completed just prior to sedation and while under constant observation by the provider. Condition determined to be adequate for proceeding with sedation.    The specific risks for the procedure were discussed with the patient at the time of informed consent and include but are not limited to perforation which could require surgery, missing significant neoplasm or lesion, hemorrhage and adverse sedative  complication.      Kelvin De La Paz MD

## 2025-02-24 ENCOUNTER — TELEPHONE (OUTPATIENT)
Dept: GASTROENTEROLOGY | Facility: CLINIC | Age: 25
End: 2025-02-24
Payer: COMMERCIAL

## 2025-02-24 LAB
PATH REPORT.COMMENTS IMP SPEC: NORMAL
PATH REPORT.COMMENTS IMP SPEC: NORMAL
PATH REPORT.FINAL DX SPEC: NORMAL
PATH REPORT.GROSS SPEC: NORMAL
PATH REPORT.MICROSCOPIC SPEC OTHER STN: NORMAL
PATH REPORT.RELEVANT HX SPEC: NORMAL
PHOTO IMAGE: NORMAL

## 2025-02-24 NOTE — TELEPHONE ENCOUNTER
2/24/2025 10:59AM  Left Voicemail (1st Attempt) and Sent Mychart (1st Attempt) for the patient to call back and schedule the following:    Appointment type: Return IBD  Provider: Dr. De La Paz or Sedrick Galeano PA-C  Return date: 3-6 month follow-up   Specialty phone number: [phone]  Additional appointment(s) needed: NA  Additonal Notes: 2/24 LVM and MYC for pt to schedule Return IBD w/ Dr. De La Paz or Sedrick Galeano in 3-6 months. SOPHY wahl Complex   Rheumatology, Gastroenterology, Nephrology, Infectious Disease, Pulmonology  [facility]        ----- Message from Jayde FERGUSON sent at 2/24/2025  8:39 AM CST -----  Jeni Moore,   Yes Sedrick would be best.   Thank you,   Libia  ----- Message -----  From: Teresa Ramírez  Sent: 2/24/2025   8:31 AM CST  To: Jayde Restrepo RN    I can't pull Camille Pérez for Return IBD- can I offer Pritha and Sedrick?     Thanks,    Teresa wahl Complex   Rheumatology, Gastroenterology, Nephrology, Infectious Disease, Pulmonology  [facility]  ----- Message -----  From: Jayde Restrepo RN  Sent: 2/24/2025   8:07 AM CST  To: Teresa Ngo,   Please see Dr. De La Paz request:Can we get follow up scheduled for this patient in clinic, ideally with me in 3-6 months, otherwise with Camille Pérez is ok.  Thanks, Kelvin De La Paz MD  Thanks,   Libia  ----- Message -----  From: Kelvin De La Paz MD  Sent: 2/21/2025  10:44 AM CST  To: [facility]    Can we get follow up scheduled for this patient in clinic, ideally with me in 3-6 months, otherwise with Camille Pérez is ok.  Thanks, Kelvin De La Paz MD

## 2025-02-26 ENCOUNTER — MYC MEDICAL ADVICE (OUTPATIENT)
Dept: FAMILY MEDICINE | Facility: CLINIC | Age: 25
End: 2025-02-26
Payer: COMMERCIAL

## 2025-03-05 ENCOUNTER — TELEPHONE (OUTPATIENT)
Dept: PHARMACY | Facility: CLINIC | Age: 25
End: 2025-03-05
Payer: COMMERCIAL

## 2025-03-05 NOTE — TELEPHONE ENCOUNTER
Pt is due for 3 mo follow up with Anastasiia HUTCHINS     Call placed to pt to initiate scheduling on 3/5/25    Outcome: LVM and MyC    *MyC scheduling tool sent has ability for pt to schedule with CUONG CANDELARIO or Gita REYES as well.*

## 2025-03-07 ENCOUNTER — VIRTUAL VISIT (OUTPATIENT)
Dept: PHARMACY | Facility: CLINIC | Age: 25
End: 2025-03-07
Attending: PHARMACIST
Payer: COMMERCIAL

## 2025-03-07 NOTE — PROGRESS NOTES
Medication Therapy Management (MTM) Encounter    ASSESSMENT:                            Medication Adherence/Access: Fransisco is not very consistent with vitamin B12 or vitamin D because he forgets to take them. Takes a dose about once per week. He plans to bring his bottles to work and keep them in his desk to try to be more consistent, as he has a hard time remembering before and after work.     Crohn's Disease:  Fransisco would benefit from continuing treatment with Hadlima 40 mg once weekly. Fransisco needs updated routine maintenance labs and is up to date on annual tuberculosis screening.     Fransisco is up to date on all currently indicated vaccinations. No access issues for advanced therapy are present. Reminders for routine cancer screening were provided.     Fransisco feels he is getting adequate calcium from diet. He currently supplements with vitamin D daily.  He had low levels of Vitamin B12 and began supplementing, he will be more consistent with taking daily. He is not indicated for a DEXA scan at this time and I recommend continued monitoring for need.      Most recent comprehensive health maintenance review was completed on 3/7/25.       PLAN:                            Reminder to schedule a follow up with Dr. De La Paz or Sedrick Galeano PA-C at your earliest convenience.   You may schedule your follow-up appointment via [employer], or by calling [phone], option 1.     Reminder to get routine maintenance labs does at your earliest convenience, and every 3 months thereafter.    As discussed, try to use a strategy such as placing your bottles in your desk at work or next to your toothbrush to remind yourself to take your vitamin D and vitamin B12 to stay more consistent.  Staying consistent with these may help with the fatigue you have been experiencing    Follow-up: 6 months or sooner if needed. Will send [employer] scheduling link.       SUBJECTIVE/OBJECTIVE:                          Fransisco Mosqueda is a 24 year  old male seen for a follow-up visit.       Reason for visit: health maintenance; Crohn's disease on adalimumab. Transferring MT care from Shauna Hanson PharmD.    Allergies/ADRs: Reviewed in chart  Past Medical History: Reviewed in chart  Tobacco: He reports that he has never smoked. He has never used smokeless tobacco.  Alcohol: none      Medication Adherence/Access: Medication barriers: forgets to take.  Plan at last visit was to attempt to be more consistent with vitamin D and vitamin B12. Patient notes he continues to struggle with this but would like to try a strategy to help him with consistency. He does not remember to take it in the morning before work, and forgets to do it before going to sleep after work. He mentions trying to take the bottles to work to put in his desk, as he will be more likely to remember this way. Also discussed strategies such as putting the pills in a lunch box or near his toothbrush (connecting taking the vitamins to another daily habit).      Crohn's Disease:   Hadlima 40 mg every 7 days  Vitamin B12 1000 mcg once daily   Vitamin d3 1000 units once daily     He continues to feel fatigued, admits he is not consistent with vitamins and would like to be better about this to help with fatigue. Denies other symptoms including loose stools, urgency, blood in stool/per rectum, abdominal pain/cramping.     Last provider visit: 2024 with Dr. De La Paz   Next provider visit: not on file   Last labs completed: 2024  Lab frequency: every 3 months   - standing labs available until 10/2025  Next labs due: now   Last TB screenin2024  PDC: 74    Therapy Start Date: , escalated 3/2023     Colonoscopy 25  Impression:             - The examined portion of the ileum was normal.   - Mild changes with granular mucosa in the sigmoid colon and in the descending colon.   - Simple Endoscopic Score for Crohn's Disease: 3, mucosal inflammatory changes.   - The distal rectum and anal  verge are normal on retroflexion view.   - Several biopsies were obtained in the rectum, right colon and left colon.   Final Diagnosis   A. RIGHT COLON, RANDOM BIOPSY:  Chronic active colitis with occasional crypt abscess and crypt architectural distortion; consistent with mildly active Crohn; negative for dysplasia      B. LEFT COLON, RANDOM BIOPSY:  Chronic active colitis with occasional granuloma, Paneth cell metaplasia, and crypt architectural distortion; consistent with mildly active Crohn; negative for dysplasia      C. RECTUM, BIOPSY:  Chronic active proctitis with occasional granuloma and crypt architectural distortion; consistent with mildly active Crohn; negative for dysplasia        PRO-3 for Crohn's Disease    Please select the one best answer for the patient's ability at this time     Over the past week, how many liquid or soft stools have you had on average per day?   0 (When scoring, multiply number by 2)   Over the past week, please rate your average abdominal pain  None : 0 points  3. Over the past week, please rate your general well-being    Slightly Under Par: 7 points    Patient not interested on elaborating on #3 - notes related to personal matter that he is working on resolving. Discussed that his care team is available to help with resources if ever needed and he expressed understanding.     Score: 7  <13: Remission  13-21: Mild Activity   22-52: Moderate Activity  >/= 53: Severe Activity        IBD Health Maintenance    Vaccinations:  All patients on immunosuppression should avoid live vaccines unless specifically indicated.    -- Influenza (every year) last 2024  -- TdaP (every 10 years) last 7/2024  -- Pneumococcal Pneumonia               Prevnar-13: not on file               Pneumovax-23: not on file               Prevnar-20: 5/10/23  -- COVID-19 last 2024  -- RSV not on file     One time confirmation of immunity or serologies:  -- Hepatitis A (serologies or immunizations) vaccine x3  2024,2025  -- Hepatitis B (serologies or immunizations) vaccine x3 2024,2025 - Serologies indicate immunity.   -- Varicella/Zoster               Varicella vaccine x2,               Zoster vaccine x2, 2/13/23, 5/10/23  -- MMR vaccine x2, 2001, 2005   -- HPV (all aged 18-26) vaccine x2 2024  -- Meningococcal meningitis (all patients at risk for meningitis)-- recalls having prior to college    Due to the immunosuppression in this patient, I would not advise administration of live vaccines such as varicella/VZV, intranasal influenza, MMR, or yellow fever vaccine (if traveling).      Immunosuppressive Screening:  -- Hep B Surface Antibody serologies indicate immunity  149 mIU/mL - [facility] 9/2022  -- Hep B Surface Antigen non-reactive  [facility] 9/2022  -- Hep B Core Antibody non-reactive  [facility] 9/2022  -- Hep C Antibody non-reactive   -- Yearly assessment of TB Negative    Lab Results   Component Value Date    HCVAB Nonreactive 07/15/2024    TBRES Negative 07/15/2024       Bone mineral density screening   -- Recommend all patients supplement with calcium and vitamin D              - feels he is getting adequate calcium from diet   -- Given minimal prior steroid use recommend continued monitoring for DEXA need              - does not recall recent or history of prednisone     Cancer Screening:  Colon cancer screening:  Given colonic, recommend patient undergo regular dysplasia surveillance   Last 2/21/25, next per Dr. De La Paz     Skin cancer screening: Recommended annually due to patient's immunosuppression and diagnosis of IBD. Derm appointment scheduled for April 2025.    Depression Screening:    PHQ-2 Score:         9/24/2024     2:08 PM 7/15/2024     8:07 AM   PHQ-2 ( 1999 [employer])   Q1: Little interest or pleasure in doing things 0 1   Q2: Feeling down, depressed or hopeless 0 1   PHQ-2 Score 0 2   Q1: Little interest or pleasure in doing things  Several days   Q2: Feeling down, depressed  or hopeless  Several days   PHQ-2 Score  2       Research:  Are you interested in being contacted about enrollment in clinical research studies? Yes    Would you like to receive a quarterly newsletter on research via email. YES [email or web address]       Misc:  -- Avoid tobacco use  -- Avoid NSAIDs as there is potentially a 25% chance of causing an IBD flare          Today's Vitals: There were no vitals taken for this visit.  ----------------  Post Discharge Medication Reconciliation Status: discharge medications reconciled, continue medications without change.    I spent 10 minutes with this patient today. All changes were made via collaborative practice agreement with Camille Pérez.     A summary of these recommendations was sent via [employer].    Gita Hanson, PharmD  MTM Pharmacist  [facility] Gastroenterology     Telemedicine Visit Details  The patient's medications can be safely assessed via a telemedicine encounter.  Type of service:  Telephone visit  Originating Location (pt. Location): Home    Distant Location (provider location):  Off-site  Start Time:  1:35 pm  End Time: 1:45 PM     Medication Therapy Recommendations  No medication therapy recommendations to display

## 2025-03-10 NOTE — PATIENT INSTRUCTIONS
"Recommendations from today's MTM visit:                                                      Reminder to schedule a follow up with Dr. De La Paz or Sedrick Galeano PA-C at your earliest convenience.   You may schedule your follow-up appointment via [employer] [facility] [place], or by calling [phone], option 1.     Reminder to get routine maintenance labs does at your earliest convenience, and every 3 months thereafter.    As discussed, try to use a strategy such as placing your bottles in your desk at work or next to your toothbrush to remind yourself to take your vitamin D and vitamin B12 to stay more consistent.  Staying consistent with these may help with the fatigue you have been experiencing    Follow-up: 6 months or sooner if needed. Will send [facility] [place] scheduling link.      It was great speaking with you today.  I value your experience and would be very thankful for your time in providing feedback in our clinic survey. In the next few days, you may receive an email or text message from [employer] with a link to a survey related to your  clinical pharmacist.\"     To schedule another MTM appointment, please call the clinic directly or you may call the MTM scheduling line at [phone].    My Clinical Pharmacist's contact information:                                                      Please feel free to contact me with any questions or concerns you have.      Gita Hanson, PharmD  [facility] Pharmacist  [facility]astroenterology    "

## 2025-04-30 DIAGNOSIS — K50.819 CROHN'S DISEASE OF SMALL AND LARGE INTESTINES WITH COMPLICATION (H): ICD-10-CM

## 2025-04-30 RX ORDER — ADALIMUMAB-BWWD 40 MG/.4ML
40 SOLUTION SUBCUTANEOUS WEEKLY
Qty: 1.6 ML | Refills: 2 | Status: SHIPPED | OUTPATIENT
Start: 2025-04-30

## 2025-04-30 RX ORDER — ADALIMUMAB-BWWD 40 MG/.4ML
40 SOLUTION SUBCUTANEOUS WEEKLY
Qty: 1.6 ML | Refills: 2 | Status: CANCELLED | OUTPATIENT
Start: 2025-04-30

## 2025-04-30 NOTE — TELEPHONE ENCOUNTER
Last provider visit: 24 Kelvin De La Paz MD   Next provider visit: not on file   Last labs completed: 7/15/24  Lab frequency: every 3 months   - standing labs available until 10/2025  Next labs due: overdue now  Last TB screenin2024  PDC: 74%    Refilled per CPA with Kelvin De La Paz MD. Sending 84 day supply worth of refills as patient will be 12 months overdue for labs come 2025. No provider follow up appointment on file. Will send [employer] message explaining labs and provider follow up needed prior to further refills.     Gita Hanson, PharmD  MTM Pharmacist  [facility]enter[facility]

## 2025-05-15 ENCOUNTER — TELEPHONE (OUTPATIENT)
Dept: DERMATOLOGY | Facility: CLINIC | Age: 25
End: 2025-05-15
Payer: COMMERCIAL

## 2025-05-15 NOTE — TELEPHONE ENCOUNTER
Patient confirmed scheduled appointment:     Date: 4/27/26  Time: 10:00 AM  Appointment type: Return dermatology  Provider: Judy Talamantes PA-C  Specialty phone number: [phone]    Additional Notes: Annual follow up; pt last saw Dr. Bennett

## 2025-05-29 ENCOUNTER — LAB (OUTPATIENT)
Dept: LAB | Facility: CLINIC | Age: 25
End: 2025-05-29
Payer: COMMERCIAL

## 2025-05-29 DIAGNOSIS — K50.819 CROHN'S DISEASE OF SMALL AND LARGE INTESTINES WITH COMPLICATION (H): ICD-10-CM

## 2025-05-29 LAB
ALBUMIN SERPL BCG-MCNC: 4.5 G/DL (ref 3.5–5.2)
ALP SERPL-CCNC: 83 U/L (ref 40–150)
ALT SERPL W P-5'-P-CCNC: 23 U/L (ref 0–70)
ANION GAP SERPL CALCULATED.3IONS-SCNC: 10 MMOL/L (ref 7–15)
AST SERPL W P-5'-P-CCNC: 23 U/L (ref 0–45)
BASOPHILS # BLD AUTO: 0 10E3/UL (ref 0–0.2)
BASOPHILS NFR BLD AUTO: 1 %
BILIRUB SERPL-MCNC: 0.6 MG/DL
BILIRUBIN DIRECT (ROCHE PRO & PURE): 0.21 MG/DL (ref 0–0.45)
BUN SERPL-MCNC: 15 MG/DL (ref 6–20)
CALCIUM SERPL-MCNC: 9.5 MG/DL (ref 8.8–10.4)
CHLORIDE SERPL-SCNC: 106 MMOL/L (ref 98–107)
CREAT SERPL-MCNC: 1.06 MG/DL (ref 0.67–1.17)
CRP SERPL-MCNC: <3 MG/L
EGFRCR SERPLBLD CKD-EPI 2021: >90 ML/MIN/1.73M2
EOSINOPHIL # BLD AUTO: 0 10E3/UL (ref 0–0.7)
EOSINOPHIL NFR BLD AUTO: 0 %
ERYTHROCYTE [DISTWIDTH] IN BLOOD BY AUTOMATED COUNT: 12.8 % (ref 10–15)
GLUCOSE SERPL-MCNC: 85 MG/DL (ref 70–99)
HCO3 SERPL-SCNC: 25 MMOL/L (ref 22–29)
HCT VFR BLD AUTO: 44.1 % (ref 40–53)
HGB BLD-MCNC: 15 G/DL (ref 13.3–17.7)
IMM GRANULOCYTES # BLD: 0 10E3/UL
IMM GRANULOCYTES NFR BLD: 0 %
LYMPHOCYTES # BLD AUTO: 3.1 10E3/UL (ref 0.8–5.3)
LYMPHOCYTES NFR BLD AUTO: 44 %
MCH RBC QN AUTO: 29.1 PG (ref 26.5–33)
MCHC RBC AUTO-ENTMCNC: 34 G/DL (ref 31.5–36.5)
MCV RBC AUTO: 86 FL (ref 78–100)
MONOCYTES # BLD AUTO: 0.5 10E3/UL (ref 0–1.3)
MONOCYTES NFR BLD AUTO: 7 %
NEUTROPHILS # BLD AUTO: 3.4 10E3/UL (ref 1.6–8.3)
NEUTROPHILS NFR BLD AUTO: 47 %
NRBC # BLD AUTO: 0 10E3/UL
NRBC BLD AUTO-RTO: 0 /100
PLATELET # BLD AUTO: 293 10E3/UL (ref 150–450)
POTASSIUM SERPL-SCNC: 4.2 MMOL/L (ref 3.4–5.3)
PROT SERPL-MCNC: 7.9 G/DL (ref 6.4–8.3)
RBC # BLD AUTO: 5.15 10E6/UL (ref 4.4–5.9)
SODIUM SERPL-SCNC: 141 MMOL/L (ref 135–145)
WBC # BLD AUTO: 7.1 10E3/UL (ref 4–11)

## 2025-05-29 PROCEDURE — 99000 SPECIMEN HANDLING OFFICE-LAB: CPT | Performed by: PATHOLOGY

## 2025-05-29 PROCEDURE — 82607 VITAMIN B-12: CPT | Performed by: INTERNAL MEDICINE

## 2025-05-30 ENCOUNTER — RESULTS FOLLOW-UP (OUTPATIENT)
Dept: GASTROENTEROLOGY | Facility: CLINIC | Age: 25
End: 2025-05-30

## 2025-06-16 ENCOUNTER — PATIENT OUTREACH (OUTPATIENT)
Dept: CARE COORDINATION | Facility: CLINIC | Age: 25
End: 2025-06-16
Payer: COMMERCIAL

## 2025-06-30 ENCOUNTER — PATIENT OUTREACH (OUTPATIENT)
Dept: CARE COORDINATION | Facility: CLINIC | Age: 25
End: 2025-06-30
Payer: COMMERCIAL

## 2025-08-26 ENCOUNTER — TELEPHONE (OUTPATIENT)
Dept: GASTROENTEROLOGY | Facility: CLINIC | Age: 25
End: 2025-08-26
Payer: COMMERCIAL

## 2025-08-26 DIAGNOSIS — K50.819 CROHN'S DISEASE OF SMALL AND LARGE INTESTINES WITH COMPLICATION (H): Primary | ICD-10-CM

## 2025-08-26 RX ORDER — ADALIMUMAB-BWWD 40 MG/.4ML
40 SOLUTION SUBCUTANEOUS WEEKLY
Qty: 1.6 ML | Refills: 0 | Status: SHIPPED | OUTPATIENT
Start: 2025-08-26

## 2025-08-31 ENCOUNTER — HEALTH MAINTENANCE LETTER (OUTPATIENT)
Age: 25
End: 2025-08-31

## 2025-09-02 ENCOUNTER — VIRTUAL VISIT (OUTPATIENT)
Dept: GASTROENTEROLOGY | Facility: CLINIC | Age: 25
End: 2025-09-02
Attending: INTERNAL MEDICINE
Payer: COMMERCIAL

## 2025-09-02 DIAGNOSIS — K50.819 CROHN'S DISEASE OF SMALL AND LARGE INTESTINES WITH COMPLICATION (H): Primary | ICD-10-CM

## 2025-09-02 PROCEDURE — 98006 SYNCH AUDIO-VIDEO EST MOD 30: CPT | Performed by: INTERNAL MEDICINE

## 2025-09-02 PROCEDURE — G2211 COMPLEX E/M VISIT ADD ON: HCPCS | Mod: 95 | Performed by: INTERNAL MEDICINE

## 2025-09-02 PROCEDURE — 1126F AMNT PAIN NOTED NONE PRSNT: CPT | Mod: 95 | Performed by: INTERNAL MEDICINE

## 2025-09-03 DIAGNOSIS — K50.819 CROHN'S DISEASE OF SMALL AND LARGE INTESTINES WITH COMPLICATION (H): ICD-10-CM

## 2025-09-03 RX ORDER — ADALIMUMAB-BWWD 40 MG/.4ML
40 SOLUTION SUBCUTANEOUS WEEKLY
Qty: 1.6 ML | Refills: 5 | Status: SHIPPED | OUTPATIENT
Start: 2025-09-03

## (undated) RX ORDER — FENTANYL CITRATE 50 UG/ML
INJECTION, SOLUTION INTRAMUSCULAR; INTRAVENOUS
Status: DISPENSED
Start: 2025-02-21